# Patient Record
Sex: FEMALE | Race: WHITE | NOT HISPANIC OR LATINO | Employment: FULL TIME | URBAN - METROPOLITAN AREA
[De-identification: names, ages, dates, MRNs, and addresses within clinical notes are randomized per-mention and may not be internally consistent; named-entity substitution may affect disease eponyms.]

---

## 2017-01-05 ENCOUNTER — OFFICE VISIT - RIVER FALLS (OUTPATIENT)
Dept: FAMILY MEDICINE | Facility: CLINIC | Age: 22
End: 2017-01-05

## 2017-01-05 ASSESSMENT — MIFFLIN-ST. JEOR: SCORE: 1478.3

## 2017-05-24 ENCOUNTER — OFFICE VISIT - RIVER FALLS (OUTPATIENT)
Dept: FAMILY MEDICINE | Facility: CLINIC | Age: 22
End: 2017-05-24

## 2017-12-18 ENCOUNTER — OFFICE VISIT - RIVER FALLS (OUTPATIENT)
Dept: FAMILY MEDICINE | Facility: CLINIC | Age: 22
End: 2017-12-18

## 2017-12-18 ASSESSMENT — MIFFLIN-ST. JEOR: SCORE: 1447.45

## 2018-08-16 ENCOUNTER — OFFICE VISIT - RIVER FALLS (OUTPATIENT)
Dept: FAMILY MEDICINE | Facility: CLINIC | Age: 23
End: 2018-08-16

## 2018-12-19 ENCOUNTER — OFFICE VISIT - RIVER FALLS (OUTPATIENT)
Dept: FAMILY MEDICINE | Facility: CLINIC | Age: 23
End: 2018-12-19

## 2019-11-04 ENCOUNTER — COMMUNICATION - RIVER FALLS (OUTPATIENT)
Dept: FAMILY MEDICINE | Facility: CLINIC | Age: 24
End: 2019-11-04

## 2020-07-09 ENCOUNTER — OFFICE VISIT - RIVER FALLS (OUTPATIENT)
Dept: FAMILY MEDICINE | Facility: CLINIC | Age: 25
End: 2020-07-09

## 2020-07-09 ASSESSMENT — MIFFLIN-ST. JEOR: SCORE: 1483.74

## 2021-12-24 ENCOUNTER — OFFICE VISIT - RIVER FALLS (OUTPATIENT)
Dept: FAMILY MEDICINE | Facility: CLINIC | Age: 26
End: 2021-12-24

## 2022-02-12 VITALS
SYSTOLIC BLOOD PRESSURE: 127 MMHG | BODY MASS INDEX: 26.19 KG/M2 | TEMPERATURE: 98.4 F | DIASTOLIC BLOOD PRESSURE: 76 MMHG | HEART RATE: 62 BPM | WEIGHT: 158.6 LBS

## 2022-02-12 VITALS
OXYGEN SATURATION: 99 % | DIASTOLIC BLOOD PRESSURE: 70 MMHG | BODY MASS INDEX: 25.99 KG/M2 | HEART RATE: 93 BPM | WEIGHT: 156 LBS | SYSTOLIC BLOOD PRESSURE: 116 MMHG | HEIGHT: 65 IN

## 2022-02-12 VITALS
HEART RATE: 60 BPM | WEIGHT: 164 LBS | TEMPERATURE: 98.3 F | DIASTOLIC BLOOD PRESSURE: 80 MMHG | RESPIRATION RATE: 16 BRPM | SYSTOLIC BLOOD PRESSURE: 118 MMHG | HEIGHT: 65 IN | BODY MASS INDEX: 27.32 KG/M2

## 2022-02-12 VITALS
DIASTOLIC BLOOD PRESSURE: 72 MMHG | WEIGHT: 162.8 LBS | BODY MASS INDEX: 27.12 KG/M2 | HEIGHT: 65 IN | TEMPERATURE: 98.1 F | SYSTOLIC BLOOD PRESSURE: 118 MMHG | HEART RATE: 63 BPM

## 2022-02-12 VITALS
BODY MASS INDEX: 25.33 KG/M2 | TEMPERATURE: 98.3 F | SYSTOLIC BLOOD PRESSURE: 120 MMHG | HEART RATE: 58 BPM | WEIGHT: 153.4 LBS | DIASTOLIC BLOOD PRESSURE: 72 MMHG

## 2022-02-12 VITALS
DIASTOLIC BLOOD PRESSURE: 72 MMHG | HEART RATE: 64 BPM | WEIGHT: 160.8 LBS | BODY MASS INDEX: 26.55 KG/M2 | SYSTOLIC BLOOD PRESSURE: 126 MMHG | TEMPERATURE: 98.1 F

## 2022-02-12 VITALS
DIASTOLIC BLOOD PRESSURE: 73 MMHG | BODY MASS INDEX: 26.64 KG/M2 | TEMPERATURE: 98.6 F | SYSTOLIC BLOOD PRESSURE: 116 MMHG | HEART RATE: 70 BPM | WEIGHT: 155.2 LBS

## 2022-02-15 NOTE — PROGRESS NOTES
Patient:   CHANEL ALBERTO            MRN: [id number]            FIN: [id number]               Age:   22 years     Sex:  Female     :  1995   Associated Diagnoses:   Right wrist pain   Author:   Brian Haynes MD      Chief Complaint   2017 2:34 PM CST   pt here for pain in right hand, she ice skates and she hit her hand on the board, has pain right near her right thumb, injury occured 2 weeks ago        Subjective   Additional information see chief complaint as noted above and confirmed with the patient.        Health Status   Allergies:    Allergic Reactions (Selected)  No known allergies   Medications:  (Selected)   Prescriptions  Prescribed  Combivent Respimat CFC free 20 mcg-100 mcg/inh inhalation aerosol: 1 puff(s), inh, qid, # 3 EA, 3 Refill(s), Type: Maintenance, Pharmacy: [facility], 1 puff(s) inh qid,x30 day(s)  Flovent  mcg/inh inhalation aerosol: 1 puff(s), inh, bid, # 3 EA, 3 Refill(s), Type: Maintenance, Pharmacy: [facility], 1 puff(s) inh bid  Ocella 3 mg-0.03 mg oral tablet: 1 tab(s), po, daily, # 84 tab(s), 4 Refill(s), Type: Maintenance, Pharmacy: [facility], 1 tab(s) po daily,x84 day(s)  fluticasone 50 mcg/inh nasal spray: 1 spray(s), nasal, bid, Instructions: in each nostril, # 3 EA, 3 Refill(s), Type: Maintenance, Pharmacy: [facility], 1 spray(s) nasal bid,Instr:in each nostril  ketoconazole 2% topical shampoo: 1 david, TOP, 2x/Wk, # 120 mL, 11 Refill(s), Type: Maintenance, Pharmacy: [facility], 1 david top 2x/wk,x30 day(s)   Problem list:    All Problems  Obesity / 7893076141 / Probable  Acne vulgaris / 043256136 / Confirmed      Objective   Vital Signs   2017 2:34 PM CST Peripheral Pulse Rate 93 bpm    Pulse Site Radial artery    Systolic Blood Pressure 116 mmHg    Diastolic Blood Pressure 70 mmHg    Mean Arterial Pressure 85 mmHg    BP Site Right arm     Oxygen Saturation 99 %      Measurements from flowsheet : Measurements   12/18/2017 2:34 PM CST Height Measured 65.25 in    Weight Measured 156 lb    BSA 1.8 m2    Body Mass Index 25.76 kg/m2      General:  Alert and oriented, No acute distress.    Musculoskeletal:  mild tenderness over lateral dorsal wrist  no bruising,  good ROM.    Psychiatric:  Cooperative, Appropriate mood & affect.       Impression and Plan   Assessment and Plan:          Diagnosis: Right wrist pain (IFY53-ZN M25.531).         Course:        wrist xray is normal radiology to over read,  discussed with patient  reviewed appropriate activity  discussed stretching and strengthening  gave information about icing and heat  reviewed medication such as NSAIDs.

## 2022-02-15 NOTE — PROGRESS NOTES
Patient:   CHANEL ALBERTO            MRN: [id number]            FIN: [id number]               Age:   21 years     Sex:  Female     :  1995   Associated Diagnoses:   Encounter for removal of sutures   Author:   Eben Wall MD      Visit Information      Date of Service: 2017 10:24 am  Performing Location: [facility]  Encounter#: [id number]      Primary Care Provider (PCP):  Zaki Vieira MD# [id number]      Referring Provider:  No referring provider recorded for selected visit.      Chief Complaint      History of Present Illness   Healing well.      Review of Systems   Constitutional:  No fever.    Integumentary:  no drainage from the wound.       Health Status   Allergies:    Allergic Reactions (Selected)  No known allergies   Medications:  (Selected)   Prescriptions  Prescribed  Combivent Respimat CFC free 20 mcg-100 mcg/inh inhalation aerosol: 1 puff(s), inh, qid, # 1 EA, 11 Refill(s), Type: Maintenance, Pharmacy: [facility], 1 puff(s) inh qid,x30 day(s)  Flovent  mcg/inh inhalation aerosol: 1 puff(s), inh, bid, # 3 EA, 5 Refill(s), Type: Maintenance, Pharmacy: [facility], 1 puff(s) inh bid  Ocella 3 mg-0.03 mg oral tablet: 1 tab(s), po, daily, # 84 tab(s), 4 Refill(s), Type: Maintenance, Pharmacy: [facility], 1 tab(s) po daily,x84 day(s)  fluticasone 50 mcg/inh nasal spray: 1 spray(s), nasal, bid, Instructions: in each nostril, # 1 EA, 3 Refill(s), Type: Maintenance, Pharmacy: [facility], 1 spray(s) nasal bid,Instr:in each nostril  ketoconazole 2% topical shampoo: 1 david, TOP, 2x/Wk, # 120 mL, 1 Refill(s), Type: Maintenance, Pharmacy: [id number], 1 david top 2x/wk,x30 day(s)   Problem list:    All Problems  Acne vulgaris / SNOMED CT 562188533 / Confirmed  Obesity / SNOMED CT 5057342648 / Probable      Histories   Procedure history:    Repair of ingrown toenail  (SNOMED CT 4440651514) on 1/11/2010 at 14 Years.  Comments:  1/28/2011 12:57 PM - Migdalia Rosa  Right great toe, lateral border.  Tonsillectomy and adenoidectomy (SNOMED CT 998613406) in 2000 at 5 Years.  Varicella vaccination (SNOMED CT 375912656) in 1997 at 2 Years.  Myringotomy and insertion of T tube (SNOMED CT 756763300) in 1997 at 2 Years.      Physical Examination   Integumentary:  laceration well healed; no drainage or redness.       Review / Management   Course:  sutures removed with some dehiscence.    After removing stitiches became dizzy and passed out. Mom states this is common but did have a confused emergence reaction      Impression and Plan   Diagnosis     Encounter for removal of sutures (WQT84-TP Z48.02).     Some dehiscence but should heal fine and keep covered while out and about.    Vasovagal episode: no driving today. It is common for her with blood or pain

## 2022-02-15 NOTE — PROGRESS NOTES
Patient:   CHANEL ALBERTO            MRN: [id number]            FIN: [id number]               Age:   25 years     Sex:  Female     :  1995   Associated Diagnoses:   Eustachian tube dysfunction   Author:   Harpreet Galvan PA-C      Chief Complaint   2020 3:13 PM CDT     Pt here for left ear pain x 5 days.      History of Present Illness   plugged left ear for 5 days,  no pain         Review of Systems   Constitutional:  No fever.    Ear/Nose/Mouth/Throat:  Nasal congestion, Sore throat.         Decreased hearing: On the left.    Respiratory:  Negative.       Health Status   Allergies:    Allergic Reactions (Selected)  No Known Medication Allergies   Medications:  (Selected)   Prescriptions  Prescribed  Combivent Respimat 20 mcg-100 mcg/inh inhalation aerosol: 1 puff(s), inh, qid, # 3 EA, 3 Refill(s), Type: Maintenance, Pharmacy: [facility], 1 puff(s) Inhale qid,x30 day(s)  Flovent  mcg/inh inhalation aerosol: = 1 puff(s), inh, bid, # 1 EA, 0 Refill(s), Type: Maintenance, Pharmacy: [id number], 1 puff(s) Inhale bid  Ocella 3 mg-0.03 mg oral tablet: 1 tab(s), po, daily, # 28 tab(s), 0 Refill(s), Type: Maintenance, Pharmacy: [facility][id number], 1 tab(s) Oral daily  fluticasone 50 mcg/inh nasal spray: 1 spray(s), nasal, bid, Instructions: in each nostril, # 3 EA, 3 Refill(s), Type: Maintenance, Pharmacy: [facility], 1 spray(s) Nasal bid,Instr:in each nostril   Problem list:    All Problems  Acne vulgaris / SNOMED CT 252759083 / Confirmed  Obesity / SNOMED CT 9410576765 / Probable  Asthma, exercise induced / SNOMED CT 53957188 / Confirmed      Histories   Past Medical History:    No active or resolved past medical history items have been selected or recorded.   Family History:       Procedure history:    Repair of ingrown toenail (5210336144) on 2010 at 14 Years.  Comments:  2011 12:57 PM Migdalia Mcnamara  great toe, lateral border.  Tonsillectomy and adenoidectomy (128609577) in 2000 at 5 Years.  Varicella vaccination (865027639) in 1997 at 2 Years.  Myringotomy and insertion of T tube (069276319) in 1997 at 2 Years.      Physical Examination   Vital Signs   7/9/2020 3:13 PM CDT Temperature Tympanic 98.3 DegF    Peripheral Pulse Rate 60 bpm    Pulse Site Radial artery    Respiratory Rate 16 br/min    Systolic Blood Pressure 118 mmHg    Diastolic Blood Pressure 80 mmHg    Mean Arterial Pressure 93 mmHg    BP Site Right arm      Measurements from flowsheet : Measurements   7/9/2020 3:13 PM CDT Height Measured - Standard 65.25 in    Weight Measured - Standard 164 lb    BSA 1.85 m2    Body Mass Index 27.08 kg/m2  HI      General:  No acute distress.    HENT:  Tympanic membranes are clear, No pharyngeal erythema, No sinus tenderness, cannot do Valsalva manuever to get ears to pop, no cerumen in ear canals.    Neck:  Supple, Non-tender, No lymphadenopathy.    Respiratory:  Lungs are clear to auscultation.       Impression and Plan   Diagnosis     Eustachian tube dysfunction (QAY93-ZG H69.80).     Summary:  will treat with atrovent nasal spray, advised to use Mucinex D, follow up if not improving.    Orders     Orders   Pharmacy:  Atrovent 42 mcg/inh nasal spray (Prescribe): 2 spray(s), nasal, qid, PRN: for nasal congestion, # 1 EA, 2 Refill(s), Type: Maintenance, [id number], 2 spray(s) Nasal qid,PRN:for nasal congestion, 65.25, in, 7/9/2020 3:13 PM CDT, Height Measured, 164, lb, 7/9/2020 3:13 PM CDT, Weight Measured.     Orders   Charges (Evaluation and Management):  39410 office outpatient visit 15 minutes (Charge) (Order): Quantity: 1, Eustachian tube dysfunction.

## 2022-02-15 NOTE — PROGRESS NOTES
Patient:   CHANEL ALBERTO            MRN: [id number]            FIN: [id number]               Age:   23 years     Sex:  Female     :  1995   Associated Diagnoses:   Onychomycosis; Pre-ulcerative corn or callous; Asthma, exercise induced   Author:   Zaki Vieira MD      Visit Information      Date of Service: 2018 11:47 am  Performing Location: [facility]  Encounter#: [id number]      Primary Care Provider (PCP):  Zaki Vieira MD    NPI# [id number]      Referring Provider:  Zaki Vieira MD# [id number]      Chief Complaint   2018 11:52 AM CDT   Bilateral foot concerns, both feet.        History of Present Illness   chief complaint and symptoms as noted above confirmed with patient   A few skin changes and nail changes for months getting better  Doing well with exercise asthma flovent daily combivent before practice  Doing well with ocp      Review of Systems   Constitutional:  Negative except as documented in history of present illness.    Ear/Nose/Mouth/Throat:  Negative.    Respiratory:  Negative except as documented in history of present illness.    Cardiovascular:  Negative.    Gastrointestinal:  Negative.    Genitourinary:  Negative.    Musculoskeletal:  Negative.    Integumentary:  Negative except as documented in history of present illness.    Neurologic:  Negative.       Health Status   Allergies:    Allergic Reactions (Selected)  No known allergies   Medications:  (Selected)   Prescriptions  Prescribed  Combivent Respimat CFC free 20 mcg-100 mcg/inh inhalation aerosol: 1 puff(s), inh, qid, # 3 EA, 3 Refill(s), Type: Maintenance, [facility], 1 puff(s) inh qid,x30 day(s)  Flovent  mcg/inh inhalation aerosol: 1 puff(s), inh, bid, # 3 EA, 3 Refill(s), Type: Maintenance, Pharm[facility], 1 puff(s) inh bid  Ocella 3 mg-0.03 mg oral tablet: 1 tab(s), po, daily, # 84 tab(s), 4 Refill(s),  Type: Maintenance, Pharmacy: [facility], 1 tab(s) po daily,x84 day(s)  fluticasone 50 mcg/inh nasal spray: 1 spray(s), nasal, bid, Instructions: in each nostril, # 3 EA, 3 Refill(s), Type: Maintenance, Pharmacy: [facility], 1 spray(s) nasal bid,Instr:in each nostril  ketoconazole 2% topical shampoo: 1 david, TOP, 2x/Wk, # 120 mL, 11 Refill(s), Type: Maintenance, Pharmacy: [facility], 1 david top 2x/wk,x30 day(s),    Medications          *denotes recorded medication          Combivent Respimat CFC free 20 mcg-100 mcg/inh inhalation aerosol: 1 puff(s), inh, qid, for 30 day(s), 3 EA, 3 Refill(s).          Ocella 3 mg-0.03 mg oral tablet: 1 tab(s), po, daily, for 84 day(s), 84 tab(s), 4 Refill(s).          Flovent  mcg/inh inhalation aerosol: 1 puff(s), inh, bid, 3 EA, 3 Refill(s).          fluticasone 50 mcg/inh nasal spray: 1 spray(s), nasal, bid, in each nostril, 3 EA, 3 Refill(s).          ketoconazole 2% topical shampoo: 1 david, TOP, 2x/Wk, for 30 day(s), 120 mL, 11 Refill(s).     Problem list:    All Problems  Acne vulgaris / SNOMED CT 016570705 / Confirmed  Obesity / SNOMED CT 6793553844 / Probable      Histories   Past Medical History:    No active or resolved past medical history items have been selected or recorded.   Family History:       Procedure history:    Repair of ingrown toenail (SNOMED CT 7518368218) on 1/11/2010 at 14 Years.  Comments:  1/28/2011 12:57 PM - Migdalia Rosa  Right great toe, lateral border.  Tonsillectomy and adenoidectomy (SNOMED CT 950906118) in 2000 at 5 Years.  Varicella vaccination (SNOMED CT 005116210) in 1997 at 2 Years.  Myringotomy and insertion of T tube (SNOMED CT 559499594) in 1997 at 2 Years.   Social History:        Alcohol Assessment            Never      Tobacco Assessment            Never      Substance Abuse Assessment            Never      Employment and Education Assessment            Student       Exercise and Physical Activity Assessment            Exercise frequency: 3-4 times/week.                     Comments:                      04/11/2016 - Arina Askew MA                     Figure skating, Hiking      Sexual Assessment            Sexually active: No.        Physical Examination   Vital Signs   8/16/2018 11:52 AM CDT Temperature Tympanic 98.6 DegF    Peripheral Pulse Rate 70 bpm    HR Method Electronic    Systolic Blood Pressure 116 mmHg    Diastolic Blood Pressure 73 mmHg    Mean Arterial Pressure 87 mmHg    BP Method Electronic      Measurements from flowsheet : Measurements   8/16/2018 11:52 AM CDT   Weight Measured - Standard                155.2 lb     General:  Alert and oriented, No acute distress.    Integumentary:  A few toe nails show some darking and thickening at tips  skin shows a few callouses.    Neurologic:  Alert, Oriented.       Impression and Plan   Diagnosis     Onychomycosis (XZI79-ZN B35.1).     Pre-ulcerative corn or callous (MWD58-LS L84).     Asthma, exercise induced (QDT39-ML J45.990).     Course:  Progressing as expected.    Plan:  foot care consider lamisil.    Patient Instructions:       Counseled: Patient, Family, Regarding diagnosis, Regarding treatment, Regarding medications, Activity.

## 2022-02-15 NOTE — TELEPHONE ENCOUNTER
---------------------  From: Kate Hernández LPN (Phone Messages Pool ([id number]_[facility]))   To: CHANEL ALBERTO    Sent: 11/6/2019 8:38:56 AM CST  Subject: FW: Birth Control & Inhaler Refill - Dr. Dariel Partida,    Both prescriptions were sent to [facility] on [facility].    Maria Guadalupe,  Kate AVILEZ LPN        ---------------------  From: CHANEL ALBERTO  To[facility]  Sent: 11/06/2019 08:24 a.m. CST  Subject: RE: Birth Control & Inhaler Refill - Dr. Vieira  Thanks, I will try to get an appointment soon.  If you could send it to the [facility] pharmacy at [address] [place] [address] that would be great. Thanks again.    Helga  ---------------------  From: Selena Silva CMA (Phone Messages Pool ([id number]_[facility]))  To: CHANEL ALBERTO  Sent: 11/4/2019 9:44:53 AM CST  Subject: RE: Birth Control & Inhaler Refill - Dr. Dariel Gaspar,       Being that you are three months overdue for an annual exam, we can only send in a 30 day protocol supply of your medications. Is there a local pharmacy you would like use? Please schedule an appointment for further refills.       Thank you,  Selena WASSERMAN CMA            ---------------------  From: CHANEL ALBERTO  To: [facility]  Sent: 11/04/2019 09:28 a.m. CST  Subject: Birth Control & Inhaler Refill - Dr. Vieira  Could I get refills on both my birth control and Flovent inhaler sent to the [facility] online pharmacy?  Thanks,  Helga

## 2022-02-15 NOTE — PROGRESS NOTES
Patient:   CHANEL ALBERTO            MRN: [id number]            FIN: [id number]               Age:   26 years     Sex:  Female     :  1995   Associated Diagnoses:   Cellulitis of toe of right foot   Author:   Leanna RICH, Valentin      Report Summary   Diagnosis  Cellulitis of toe of right foot (XMI79-IO L03.031).  Patient InstructionsSummaryOrders   Visit Information   Visit type:  General concerns.    Accompanied by:  No one.    Source of history:  Self, Medical record.    Referral source:  Self.    History limitation:  None.       Chief Complaint   2021 10:34 AM CST  Right third toe had sutures removed, now has yellow discharge and redness        History of Present Illness   Exacerbating factors consist of movement.  Relieving factors consist of none.  Right third toe is erythematous. Had some purulent drainage. No fever or chills. Has callus and bone spur excised. Sutures out . CC above noted and confirmed with the patient..        Review of Systems   Constitutional:  Negative.    Musculoskeletal:  Negative except as documented in history of present illness.    Integumentary:  Negative except as documented in history of present illness.    Neurologic:  Negative.       Health Status   Allergies:    Allergic Reactions (Selected)  No Known Medication Allergies   Medications:  (Selected)   Prescriptions  Prescribed  Atrovent 42 mcg/inh nasal spray: 2 spray(s), nasal, qid, PRN: for nasal congestion, # 1 EA, 2 Refill(s), Type: Maintenance, [id number], 2 spray(s) Nasal qid,PRN:for nasal congestion, 65.25, in, 20 15:13:00 CDT, Height Measured, 164, lb, 20 15:13:00 CDT,...  Combivent Respimat 20 mcg-100 mcg/inh inhalation aerosol: 1 puff(s), inh, qid, # 3 EA, 3 Refill(s), Type: Maintenance, Pharmacy: [facility], 1 puff(s) Inhale qid,x30 day(s)  Flovent  mcg/inh inhalation aerosol: = 1 puff(s), inh, bid, # 1 EA, 0 Refill(s), Type:  Maintenance, Pharmacy: [facility] #[id number], 1 puff(s) Inhale bid  Keflex 500 mg oral capsule: = 1 cap(s) ( 500 mg ), PO, TID, x 7 day(s), # 21 cap(s), 0 Refill(s), Type: Acute, Pharmacy: [facility] #[id number], 1 cap(s) Oral tid,x7 day(s), 65.25, in, 07/09/20 15:13:00 CDT, Height Measured, 158.6, lb, 12/24/21 10:34:00 CST, Weight Measured...  Ocella 3 mg-0.03 mg oral tablet: 1 tab(s), po, daily, # 28 tab(s), 0 Refill(s), Type: Maintenance, Pharmacy: [facility] #[id number], 1 tab(s) Oral daily  fluticasone 50 mcg/inh nasal spray: 1 spray(s), nasal, bid, Instructions: in each nostril, # 3 EA, 3 Refill(s), Type: Maintenance, Pharmacy: [facility], 1 spray(s) Nasal bid,Instr:in each nostril   Problem list:    All Problems  Obesity / SNOMED CT 5579800462 / Probable  Asthma, exercise induced / SNOMED CT 45123086 / Confirmed  Acne vulgaris / SNOMED CT 177465039 / Confirmed      Histories   Past Medical History:    No active or resolved past medical history items have been selected or recorded.   Family History:       Procedure history:    Repair of ingrown toenail (2793461091) on 1/11/2010 at 14 Years.  Comments:  1/28/2011 12:57 PM [facility] Migdalia Jones  Right great toe, lateral border.  Tonsillectomy and adenoidectomy (421760685) in 2000 at 5 Years.  Varicella vaccination (714387508) in 1997 at 2 Years.  Myringotomy and insertion of T tube (070704496) in 1997 at 2 Years.   Social History:        Electronic Cigarette/Vaping Assessment            Electronic Cigarette Use: Never.      Alcohol Assessment            Never      Tobacco Assessment            Never            Never (less than 100 in lifetime)      Substance Abuse Assessment            Never      Employment and Education Assessment            Student      Exercise and Physical Activity Assessment            Exercise frequency: 3-4 times/week.                     Comments:                      04/11/2016 - Azar KAPLAN,  Arina IBANEZ                     Figure skating, Hiking      Sexual Assessment            Sexually active: No.        Physical Examination   Vital Signs   12/24/2021 10:34 AM CST Temperature Tympanic 98.4 DegF    Peripheral Pulse Rate 62 bpm    HR Method Electronic    Systolic Blood Pressure 127 mmHg    Diastolic Blood Pressure 76 mmHg    Mean Arterial Pressure 93 mmHg    BP Site Right arm    BP Method Electronic      Measurements from flowsheet : Measurements   12/24/2021 10:34 AM CST  Weight Measured - Standard                158.6 lb     Cardiovascular:       Capillary refill: Right, Lower extremity, Within normal limits.    Integumentary:  Mild erythema to toe is question. No drainage. Mild discomfort. .       Impression and Plan   Diagnosis     Cellulitis of toe of right foot (YSL18-DQ L03.031).     Patient Instructions:       Counseled: Patient, Regarding diagnosis, Regarding medications, Activity, Verbalized understanding.    Summary:  To ortho Quick Sunday if not improved. To ED if acutely worse, with fever, chills etc.    Orders     Orders (Selected)   Prescriptions  Prescribed  Keflex 500 mg oral capsule: = 1 cap(s) ( 500 mg ), PO, TID, x 7 day(s), # 21 cap(s), 0 Refill(s), Type: Acute, Pharmacy: [facility] #[id number], 1 cap(s) Oral tid,x7 day(s), 65.25, in, 07/09/20 15:13:00 CDT, Height Measured, 158.6, lb, 12/24/21 10:34:00 CST, Weight Measured....

## 2022-02-15 NOTE — LETTER
(Inserted Image. Unable to display)     August 19, 2019      CHANEL ALBERTO  [address] [place], [place] [id number]          Dear CHANEL,      Thank you for selecting [facility] for your healthcare needs.      Our records indicate you are due for the following services:     Annual Physical and Gynecologic Exam ~ Yearly wellness exams are important for your ongoing health and wellness.  This exam gives you the opportunity to meet with your health care provider to review your health, update immunizations and to recommend preventive screenings that you may be due for.  At your wellness visit, your Healthcare Provider will determine the need for a gynecologic exam and/or Pap smear.      To schedule an appointment or if you have further questions, please contact your primary clinic:   [facility]       [phone]   [facility]       [phone]              [facility]     [phone]      Powered by [employer] [facility]    Sincerely,    Zaki Vieira MD

## 2022-02-15 NOTE — NURSING NOTE
Comprehensive Intake Entered On:  7/9/2020 3:18 PM CDT    Performed On:  7/9/2020 3:13 PM CDT by Tayo Eldridge CMA               Summary   Chief Complaint :   Pt here for left ear pain x 5 days.   Menstrual Status :   Menarcheal   Weight Measured :   164 lb(Converted to: 164 lb 0 oz, 74.39 kg)    Height Measured :   65.25 in(Converted to: 5 ft 5 in, 165.73 cm)    Body Mass Index :   27.08 kg/m2 (HI)    Body Surface Area :   1.85 m2   Systolic Blood Pressure :   118 mmHg   Diastolic Blood Pressure :   80 mmHg   Mean Arterial Pressure :   93 mmHg   Peripheral Pulse Rate :   60 bpm   BP Site :   Right arm   Pulse Site :   Radial artery   Temperature Tympanic :   98.3 DegF(Converted to: 36.8 DegC)    Respiratory Rate :   16 br/min   Tayo Eldridge CMA - 7/9/2020 3:13 PM CDT   Health Status   Allergies Verified? :   Yes   Medication History Verified? :   Yes   Immunizations Current :   No   Medical History Verified? :   Yes   Pre-Visit Planning Status :   Not completed   Tobacco Use? :   Never smoker   Tayo Eldridge CMA - 7/9/2020 3:13 PM CDT   Meds / Allergies   (As Of: 7/9/2020 3:18:15 PM CDT)   Allergies (Active)   No Known Medication Allergies  Estimated Onset Date:   Unspecified ; Created By:   Gianna Miller; Reaction Status:   Active ; Category:   Drug ; Substance:   No Known Medication Allergies ; Type:   Allergy ; Updated By:   Gianna Miller; Reviewed Date:   7/9/2020 3:16 PM CDT        Medication List   (As Of: 7/9/2020 3:18:15 PM CDT)   Prescription/Discharge Order    drospirenone-ethinyl estradiol  :   drospirenone-ethinyl estradiol ; Status:   Prescribed ; Ordered As Mnemonic:   Ocella 3 mg-0.03 mg oral tablet ; Simple Display Line:   1 tab(s), po, daily, 28 tab(s), 0 Refill(s) ; Ordering Provider:   Zaki Vieira MD; Catalog Code:   drospirenone-ethinyl estradiol ; Order Dt/Tm:   11/6/2019 8:37:42 AM CST          fluticasone  :   fluticasone ; Status:   Prescribed ; Ordered As Mnemonic:    Flovent  mcg/inh inhalation aerosol ; Simple Display Line:   1 puff(s), inh, bid, 1 EA, 0 Refill(s) ; Ordering Provider:   Zaki Vieira MD; Catalog Code:   fluticasone ; Order Dt/Tm:   11/6/2019 8:36:19 AM CST          albuterol-ipratropium  :   albuterol-ipratropium ; Status:   Prescribed ; Ordered As Mnemonic:   Combivent Respimat 20 mcg-100 mcg/inh inhalation aerosol ; Simple Display Line:   1 puff(s), inh, qid, for 30 day(s), 3 EA, 3 Refill(s) ; Ordering Provider:   Zaki Vieira MD; Catalog Code:   albuterol-ipratropium ; Order Dt/Tm:   8/16/2018 12:11:23 PM CDT          fluticasone nasal  :   fluticasone nasal ; Status:   Prescribed ; Ordered As Mnemonic:   fluticasone 50 mcg/inh nasal spray ; Simple Display Line:   1 spray(s), nasal, bid, in each nostril, 3 EA, 3 Refill(s) ; Ordering Provider:   Zaki Vieira MD; Catalog Code:   fluticasone nasal ; Order Dt/Tm:   8/16/2018 12:11:17 PM CDT            ID Risk Screen   Recent Travel History :   No recent travel   Family Member Travel History :   No recent travel   Other Exposure to Infectious Disease :   Unknown   Tayo Eldridge CMA - 7/9/2020 3:13 PM CDT   Social History   Social History   (As Of: 7/9/2020 3:18:15 PM CDT)   Alcohol:        Never   (Last Updated: 1/24/2013 11:16:04 AM CST by Lilly Chopra RN)          Tobacco:        Never   (Last Updated: 1/24/2013 11:15:47 AM CST by Lilly Chopra RN)          Substance Abuse:        Never   (Last Updated: 1/24/2013 11:15:43 AM CST by Lilly Chopra RN)          Employment/School:        Student   (Last Updated: 4/11/2016 9:55:01 AM CDT by Arina Askew MA)          Exercise:        Exercise frequency: 3-4 times/week.   Comments:  4/11/2016 9:54 AM - Arina Askew MA: Figure skating, Hiking   (Last Updated: 4/11/2016 9:54:41 AM CDT by Arina Askew MA)          Sexual:        Sexually active: No.   (Last Updated: 1/24/2013 11:16:25 AM CST by Nav VELZI,  Lilly)            OB/GYN History   Menstrual Status :   Menarcheal   Tayo Eldridge CMA - 7/9/2020 3:13 PM CDT   Pregnancy History   (As Of: 7/9/2020 3:18:15 PM CDT)   No pregnancy history documented

## 2022-02-15 NOTE — TELEPHONE ENCOUNTER
---------------------  From: Leslie Shanks CMA   Sent: 1/3/2019 2:47:45 PM CST  Subject: General Message-Echo     Phone Message    PCP:   LEI      Time of Call:  1428       Person Calling:  self   Phone number:  [phone]    Returned call at: 1445    Note:   calling for Echo results.    Pt called and notified that a letter went out today and Echo was normal

## 2022-02-15 NOTE — NURSING NOTE
Comprehensive Intake Entered On:  12/24/2021 10:38 AM CST    Performed On:  12/24/2021 10:34 AM CST by Radha León CMA               Summary   Chief Complaint :   Right third toe had sutures removed, now has yellow discharge and redness    Menstrual Status :   Menarcheal   Weight Measured :   158.6 lb(Converted to: 158 lb 10 oz, 71.940 kg)    Systolic Blood Pressure :   127 mmHg   Diastolic Blood Pressure :   76 mmHg   Mean Arterial Pressure :   93 mmHg   Peripheral Pulse Rate :   62 bpm   BP Site :   Right arm   BP Method :   Electronic   HR Method :   Electronic   Temperature Tympanic :   98.4 DegF(Converted to: 36.9 DegC)    Radha León CMA - 12/24/2021 10:34 AM CST   Health Status   Allergies Verified? :   Yes   Medication History Verified? :   Yes   Immunizations Current :   No   Medical History Verified? :   Yes   Pre-Visit Planning Status :   Completed   Tobacco Use? :   Never smoker   Radha León CMA - 12/24/2021 10:34 AM CST   Consents   Consent for Immunization Exchange :   Consent Granted   Consent for Immunizations to Providers :   Consent Granted   Radha León CMA - 12/24/2021 10:34 AM CST   Meds / Allergies   (As Of: 12/24/2021 10:38:51 AM CST)   Allergies (Active)   No Known Medication Allergies  Estimated Onset Date:   Unspecified ; Created By:   Gianna Miller; Reaction Status:   Active ; Category:   Drug ; Substance:   No Known Medication Allergies ; Type:   Allergy ; Updated By:   Gianna Miller; Reviewed Date:   12/24/2021 10:37 AM CST        Medication List   (As Of: 12/24/2021 10:38:51 AM CST)   Prescription/Discharge Order    ipratropium nasal  :   ipratropium nasal ; Status:   Prescribed ; Ordered As Mnemonic:   Atrovent 42 mcg/inh nasal spray ; Simple Display Line:   2 spray(s), nasal, qid, PRN: for nasal congestion, 1 EA, 2 Refill(s) ; Ordering Provider:   Cole RICH, Harpreet HOPPER; Catalog Code:   ipratropium nasal ; Order Dt/Tm:   7/9/2020 3:28:17 PM CDT           drospirenone-ethinyl estradiol  :   drospirenone-ethinyl estradiol ; Status:   Prescribed ; Ordered As Mnemonic:   Ocella 3 mg-0.03 mg oral tablet ; Simple Display Line:   1 tab(s), po, daily, 28 tab(s), 0 Refill(s) ; Ordering Provider:   Zaki Vieira MD; Catalog Code:   drospirenone-ethinyl estradiol ; Order Dt/Tm:   11/6/2019 8:37:42 AM CST          fluticasone nasal  :   fluticasone nasal ; Status:   Prescribed ; Ordered As Mnemonic:   fluticasone 50 mcg/inh nasal spray ; Simple Display Line:   1 spray(s), nasal, bid, in each nostril, 3 EA, 3 Refill(s) ; Ordering Provider:   Zaki Vieira MD; Catalog Code:   fluticasone nasal ; Order Dt/Tm:   8/16/2018 12:11:17 PM CDT          albuterol-ipratropium  :   albuterol-ipratropium ; Status:   Prescribed ; Ordered As Mnemonic:   Combivent Respimat 20 mcg-100 mcg/inh inhalation aerosol ; Simple Display Line:   1 puff(s), inh, qid, for 30 day(s), 3 EA, 3 Refill(s) ; Ordering Provider:   Zaki Vieira MD; Catalog Code:   albuterol-ipratropium ; Order Dt/Tm:   8/16/2018 12:11:23 PM CDT          fluticasone  :   fluticasone ; Status:   Prescribed ; Ordered As Mnemonic:   Flovent  mcg/inh inhalation aerosol ; Simple Display Line:   1 puff(s), inh, bid, 1 EA, 0 Refill(s) ; Ordering Provider:   Zaki Vieira MD; Catalog Code:   fluticasone ; Order Dt/Tm:   11/6/2019 8:36:19 AM CST            Social History   Social History   (As Of: 12/24/2021 10:38:51 AM CST)   Alcohol:        Never   (Last Updated: 1/24/2013 11:16:04 AM CST by Lilly Chopra RN)          Tobacco:        Never   (Last Updated: 1/24/2013 11:15:47 AM CST by Nav RN, Lilly)   Never (less than 100 in lifetime)   (Last Updated: 12/24/2021 10:34:18 AM CST by Radha León CMA)          Electronic Cigarette/Vaping:        Electronic Cigarette Use: Never.   (Last Updated: 12/24/2021 10:37:15 AM CST by Radha León CMA)          Substance Abuse:        Never   (Last Updated:  1/24/2013 11:15:43 AM CST by Lilly Chopra RN)          Employment/School:        Student   (Last Updated: 4/11/2016 9:55:01 AM CDT by Arina Askew MA)          Exercise:        Exercise frequency: 3-4 times/week.   Comments:  4/11/2016 9:54 AM - Arina Askew MA: Figure skating, Hiking   (Last Updated: 4/11/2016 9:54:41 AM CDT by Arina Askew MA)          Sexual:        Sexually active: No.   (Last Updated: 1/24/2013 11:16:25 AM CST by Lilly Chopra RN)

## 2022-02-15 NOTE — LETTER
(Inserted Image. Unable to display)   January 03, 2019      CHANEL ALBERTO  [address] [place], [place] [id number]        Dear CHANEL,      Thank you for selecting [facility] for your healthcare needs.     Your recent echocardiogram was normal.           Please contact me or my assistant at [phone] if you have any questions or concerns.     Sincerely,        Zaki Vieira MD

## 2022-02-15 NOTE — TELEPHONE ENCOUNTER
---------------------  From: Selena Silva CMA (Phone Messages Pool ([id number]_[facility]))   To: CHANEL ALBERTO    Sent: 11/4/2019 9:44:53 AM CST  Subject: RE: Birth Control & Inhaler Refill - Dr. Dariel Gaspar,    Being that you are three months overdue for an annual exam, we can only send in a 30 day protocol supply of your medications. Is there a local pharmacy you would like use? Please schedule an appointment for further refills.    Thank you,  Selena WASSERMAN CMA      ---------------------  From: CHANEL ALBERTO  To: [facility]  Sent: 11/04/2019 09:28 a.m. CST  Subject: Birth Control & Inhaler Refill - Dr. Vieira  Could I get refills on both my birth control and Flovent inhaler sent to the [facility] online pharmacy?    Thanks,  Helga

## 2022-02-15 NOTE — PROGRESS NOTES
Patient:   CHANEL ALBERTO            MRN: [id number]            FIN: [id number]               Age:   23 years     Sex:  Female     :  1995   Associated Diagnoses:   Asthma, exercise induced   Author:   Zaki Vieira MD      Visit Information      Date of Service: 2018 11:30 am  Performing Location: [facility]  Encounter#: [id number]      Primary Care Provider (PCP):  Zaki Vieira MD    NPI# [id number]      Referring Provider:  Zaki Vieira MD, NPI# [id number]      Chief Complaint   2018 11:32 AM CST  Asthma, worsening with sports.      History of Present Illness   Patient is in because of shortness of breath with training.  She thinks consider exercise-induced asthma.  She notes when she is finally working up to competition level she is getting more much more dyspneic.  Is only with peak skating.  No chest pain no tachycardia no significant cough.  She is using her Flovent she pre-medicates before training with Combivent notes no lightheadedness no dizziness no headaches never any chest pain         Review of Systems   Constitutional:  Negative except as documented in history of present illness.    Ear/Nose/Mouth/Throat:  Negative.    Respiratory:  Negative except as documented in history of present illness.    Cardiovascular:  Negative except as documented in history of present illness.    Gastrointestinal:  Negative.    Musculoskeletal:  Negative.    Integumentary:  Negative.    Neurologic:  Negative.       Health Status   Allergies:    Allergic Reactions (Selected)  No Known Medication Allergies   Medications:  (Selected)   Prescriptions  Prescribed  Combivent Respimat 20 mcg-100 mcg/inh inhalation aerosol: 1 puff(s), inh, qid, # 3 EA, 3 Refill(s), Type: Maintenance, Pharmacy: [facility], 1 puff(s) Inhale qid,x30 day(s)  Flovent  mcg/inh inhalation aerosol: 1 puff(s), inh, bid, # 3 EA, 3 Refill(s), Type: Maintenance, Pharmacy:  [facility], 1 puff(s) Inhale bid  Ocella 3 mg-0.03 mg oral tablet: 1 tab(s), po, daily, # 84 tab(s), 4 Refill(s), Type: Maintenance, Pharmacy: [facility], 1 tab(s) Oral daily,x84 day(s)  fluticasone 50 mcg/inh nasal spray: 1 spray(s), nasal, bid, Instructions: in each nostril, # 3 EA, 3 Refill(s), Type: Maintenance, Pharmacy: [facility], 1 spray(s) Nasal bid,Instr:in each nostril,    Medications          *denotes recorded medication          Combivent Respimat 20 mcg-100 mcg/inh inhalation aerosol: 1 puff(s), inh, qid, for 30 day(s), 3 EA, 3 Refill(s).          Ocella 3 mg-0.03 mg oral tablet: 1 tab(s), po, daily, for 84 day(s), 84 tab(s), 4 Refill(s).          Flovent  mcg/inh inhalation aerosol: 1 puff(s), inh, bid, 3 EA, 3 Refill(s).          fluticasone 50 mcg/inh nasal spray: 1 spray(s), nasal, bid, in each nostril, 3 EA, 3 Refill(s).     Problem list:    All Problems  Acne vulgaris / SNOMED CT 476533378 / Confirmed  Asthma, exercise induced / SNOMED CT 34027881 / Confirmed  Obesity / SNOMED CT 2398351914 / Probable      Histories   Past Medical History:    No active or resolved past medical history items have been selected or recorded.   Family History:       Procedure history:    Repair of ingrown toenail (SNOMED CT 3797393022) on 1/11/2010 at 14 Years.  Comments:  1/28/2011 12:57 PM Migdalia Mcnamara  Right great toe, lateral border.  Tonsillectomy and adenoidectomy (SNOMED CT 097441342) in 2000 at 5 Years.  Varicella vaccination (SNOMED CT 278879233) in 1997 at 2 Years.  Myringotomy and insertion of T tube (SNOMED CT 227771183) in 1997 at 2 Years.   Social History:        Alcohol Assessment            Never      Tobacco Assessment            Never      Substance Abuse Assessment            Never      Employment and Education Assessment            Student      Exercise and Physical Activity Assessment            Exercise  frequency: 3-4 times/week.                     Comments:                      04/11/2016 - Arina Askew MA                     Figure skating, Hiking      Sexual Assessment            Sexually active: No.      Physical Examination   Vital Signs   12/19/2018 11:32 AM CST Temperature Tympanic 98.3 DegF    Peripheral Pulse Rate 58 bpm  LOW    HR Method Electronic    Systolic Blood Pressure 120 mmHg    Diastolic Blood Pressure 72 mmHg    Mean Arterial Pressure 88 mmHg    BP Method Electronic      Measurements from flowsheet : Measurements   12/19/2018 11:32 AM CST  Weight Measured - Standard                153.4 lb     General:  Alert and oriented, No acute distress.    Eye:  Normal conjunctiva.    HENT:  Tympanic membranes are clear, No pharyngeal erythema.    Neck:  Supple, Non-tender, No lymphadenopathy.    Respiratory:  Lungs are clear to auscultation, Respirations are non-labored, Breath sounds are equal.    Cardiovascular:  Normal rate, Regular rhythm, No murmur.       Impression and Plan   Diagnosis     Asthma, exercise induced (FRX32-EJ J45.990).     Course:  Not progressing as expected.    Patient Instructions:       Counseled: Patient, Family, Regarding diagnosis, Regarding treatment, Regarding medications, Activity.    Pulmonary function testing look fine.  Certainly may be just a conditioning issue but she has been working at this time for several months.  Will check echocardiogram on her will also get CBC and ferritin.

## 2022-02-15 NOTE — PROGRESS NOTES
Patient:   CHANEL ALBERTO            MRN: [id number]            FIN: [id number]               Age:   22 years     Sex:  Female     :  1995   Associated Diagnoses:   Acne vulgaris; Acute joint pain   Author:   Zaki Vieira MD      Visit Information      Date of Service: 2017 01:30 pm  Performing Location: [facility]  Encounter#: [id number]      Chief Complaint   2017 1:33 PM CDT    Left wrist, right shoulder pain.      History of Present Illness   chief complaint and symptoms as noted above confirmed with patient   1 week now normal  no injury  Seasonal inhaler use  rare use of the shampoo  Periods normal         Review of Systems   Constitutional:  Negative.    Eye:  Negative.    Ear/Nose/Mouth/Throat:  Negative.    Respiratory:  Negative.    Cardiovascular:  Negative.    Gastrointestinal:  Negative.    Genitourinary:  Negative.    Hematology/Lymphatics:  Negative.    Endocrine:  Negative.    Immunologic:  Negative.    Musculoskeletal   Integumentary:  Negative.    Neurologic:  Negative.       Health Status   Allergies:    Allergic Reactions (Selected)  No known allergies   Medications:  (Selected)   Prescriptions  Prescribed  Combivent Respimat CFC free 20 mcg-100 mcg/inh inhalation aerosol: 1 puff(s), inh, qid, # 1 EA, 11 Refill(s), Type: Maintenance, Pharmacy: [facility], 1 puff(s) inh qid,x30 day(s)  Flovent  mcg/inh inhalation aerosol: 1 puff(s), inh, bid, # 3 EA, 5 Refill(s), Type: Maintenance, Pharm[facility], 1 puff(s) inh bid  Ocella 3 mg-0.03 mg oral tablet: 1 tab(s), po, daily, # 84 tab(s), 4 Refill(s), Type: Maintenance, Pharmacy: [facility], 1 tab(s) po daily,x84 day(s)  fluticasone 50 mcg/inh nasal spray: 1 spray(s), nasal, bid, Instructions: in each nostril, # 1 EA, 3 Refill(s), Type: Maintenance, Pharm[facility], 1 spray(s) nasal bid,Instr:in each  nostril  ketoconazole 2% topical shampoo: 1 david, TOP, 2x/Wk, # 120 mL, 1 Refill(s), Type: Maintenance, Pharmacy: [id number], 1 david top 2x/wk,x30 day(s)   Problem list:    All Problems  Obesity / SNOMED CT 5935538200 / Probable  Acne vulgaris / SNOMED CT 529451771 / Confirmed      Histories   Past Medical History:    No active or resolved past medical history items have been selected or recorded.   Family History:       Procedure history:    Repair of ingrown toenail (SNOMED CT 7723863836) on 1/11/2010 at 14 Years.  Comments:  1/28/2011 12:57 PM - Migdalia Rosa  Right great toe, lateral border.  Tonsillectomy and adenoidectomy (SNOMED CT 671831649) in 2000 at 5 Years.  Varicella vaccination (SNOMED CT 470288781) in 1997 at 2 Years.  Myringotomy and insertion of T tube (SNOMED CT 396537060) in 1997 at 2 Years.   Social History:        Alcohol Assessment            Never      Tobacco Assessment            Never      Substance Abuse Assessment            Never      Employment and Education Assessment            Student      Exercise and Physical Activity Assessment            Exercise frequency: 3-4 times/week.                     Comments:                      04/11/2016 - Arina Askew MA                     Figure skating, Hiking      Sexual Assessment            Sexually active: No.        Physical Examination   Vital Signs   5/24/2017 1:33 PM CDT Temperature Tympanic 98.1 DegF    Peripheral Pulse Rate 64 bpm    Systolic Blood Pressure 126 mmHg    Diastolic Blood Pressure 72 mmHg    Mean Arterial Pressure 90 mmHg      Measurements from flowsheet : Measurements   5/24/2017 1:33 PM CDT    Weight Measured - Standard                160.8 lb     General:  Alert and oriented, No acute distress.    Neck:  Supple, Non-tender.    Respiratory:  Respirations are non-labored.    Cardiovascular:  Normal rate, Regular rhythm.    Gastrointestinal:  Soft.    Musculoskeletal:  Normal range of motion, Normal  strength, No tenderness, No swelling.    Integumentary:  No rash.    Neurologic:  Alert, Oriented.       Impression and Plan   Diagnosis     Acne vulgaris (JBA44-BD L70.0).     Acute joint pain (JCJ77-JA M25.50).     Course:  Progressing as expected.    Plan:  fu 1 year  Not sexually active.    Patient Instructions:       Counseled: Patient, Regarding diagnosis, Regarding medications, Activity.

## 2022-04-07 ENCOUNTER — OFFICE VISIT (OUTPATIENT)
Dept: FAMILY MEDICINE | Facility: CLINIC | Age: 27
End: 2022-04-07
Payer: COMMERCIAL

## 2022-04-07 ENCOUNTER — TELEPHONE (OUTPATIENT)
Dept: FAMILY MEDICINE | Facility: CLINIC | Age: 27
End: 2022-04-07

## 2022-04-07 VITALS
BODY MASS INDEX: 25.74 KG/M2 | SYSTOLIC BLOOD PRESSURE: 116 MMHG | WEIGHT: 155.9 LBS | OXYGEN SATURATION: 98 % | HEART RATE: 85 BPM | TEMPERATURE: 98.1 F | DIASTOLIC BLOOD PRESSURE: 64 MMHG

## 2022-04-07 DIAGNOSIS — J06.9 VIRAL URI WITH COUGH: ICD-10-CM

## 2022-04-07 DIAGNOSIS — R05.9 COUGH: Primary | ICD-10-CM

## 2022-04-07 DIAGNOSIS — B97.89 SORE THROAT (VIRAL): ICD-10-CM

## 2022-04-07 DIAGNOSIS — J02.8 SORE THROAT (VIRAL): ICD-10-CM

## 2022-04-07 PROBLEM — J45.990 EXERCISE-INDUCED ASTHMA: Status: ACTIVE | Noted: 2022-04-07

## 2022-04-07 PROBLEM — L70.0 ACNE VULGARIS: Status: ACTIVE | Noted: 2022-04-07

## 2022-04-07 LAB
DEPRECATED S PYO AG THROAT QL EIA: NEGATIVE
GROUP A STREP BY PCR: NOT DETECTED

## 2022-04-07 PROCEDURE — 99214 OFFICE O/P EST MOD 30 MIN: CPT | Performed by: NURSE PRACTITIONER

## 2022-04-07 PROCEDURE — 87651 STREP A DNA AMP PROBE: CPT | Performed by: NURSE PRACTITIONER

## 2022-04-07 RX ORDER — DROSPIRENONE AND ETHINYL ESTRADIOL 0.03MG-3MG
1 KIT ORAL DAILY
COMMUNITY
Start: 2022-02-23 | End: 2023-02-23

## 2022-04-07 RX ORDER — CLOBETASOL PROPIONATE 0.5 MG/ML
SOLUTION TOPICAL PRN
COMMUNITY
End: 2023-03-02

## 2022-04-07 RX ORDER — FLUTICASONE PROPIONATE 50 MCG
1-2 SPRAY, SUSPENSION (ML) NASAL DAILY
COMMUNITY
End: 2022-06-14

## 2022-04-07 RX ORDER — ALBUTEROL SULFATE 90 UG/1
2 AEROSOL, METERED RESPIRATORY (INHALATION) EVERY 4 HOURS PRN
COMMUNITY
End: 2022-06-14

## 2022-04-07 RX ORDER — FLUTICASONE PROPIONATE 110 UG/1
1-2 AEROSOL, METERED RESPIRATORY (INHALATION) DAILY
COMMUNITY
End: 2022-06-14

## 2022-04-07 RX ORDER — KETOCONAZOLE 20 MG/ML
SHAMPOO TOPICAL DAILY PRN
COMMUNITY
End: 2024-02-22

## 2022-04-07 NOTE — TELEPHONE ENCOUNTER
0874 Pt was notified by phone that rapid strep was negative. This will be sent in for a culture and if anything shows up patient will be informed. Advised that if symptoms are worsening or not improving to follow up in clinic.

## 2022-04-07 NOTE — LETTER
April 8, 2022      Chasity Irwinsteven  [address] [facility] [address]        Dear ,    We are writing to inform you of your test results.    The rapid strep and the PCR test are both negative, hope you are feeling better.    Resulted Orders   Streptococcus A Rapid Screen w/Reflex to PCR - Clinic Collect   Result Value Ref Range    Group A Strep antigen Negative Negative   Group A Streptococcus PCR Throat Swab   Result Value Ref Range    Group A strep by PCR Not Detected Not Detected    Narrative    The Xpert Xpress Strep A test, performed on the [employer], is a rapid, qualitative in vitro diagnostic test for the detection of Streptococcus pyogenes (Group A ß-hemolytic Streptococcus, Strep A) in throat swab specimens from patients with signs and symptoms of pharyngitis. The Xpert Xpress Strep A test can be used as an aid in the diagnosis of Group A Streptococcal pharyngitis. The assay is not intended to monitor treatment for Group A Streptococcus infections. The Xpert Xpress Strep A test utilizes an automated real-time polymerase chain reaction (PCR) to detect Streptococcus pyogenes DNA.       If you have any questions or concerns, please call the clinic at the number listed above.       Sincerely,      Felicity Lynch NP

## 2022-04-07 NOTE — PROGRESS NOTES
Assessment & Plan     Cough  Advised some Robitussin and that she be more aggressive with bumping up her asthma care.  Use the albuterol 4 times a day and the Flovent 2 inhalations twice a day while she gets through this viral episode    Sore throat (viral)  Strep test was negative today and she was called with this information.  Given the cough I am doubtful this is a strep infection  - Streptococcus A Rapid Screen w/Reflex to PCR - Clinic Collect  - Group A Streptococcus PCR Throat Swab    Viral URI with cough  As above                   No follow-ups on file.    Felicity Lynch NP  [facility] is a 26 year old who presents for the following health issues: cough,chest pain with cough, sore throat, hurts to swallow, started Sunday, denies fever patient is here with respiratory symptoms that started 5 days ago.  Started with a cough.  No shortness of breath no fever.  She has a sore throat and it feels like the last time she had strep throat which was about 4 years ago.  She would like to be tested for strep.  She does have underlying asthma and takes Flovent once a day and Flonase daily.  She is using her albuterol a little bit but has not increased her Flovent.  She did take her home Covid test and that was negative.  Has tried no other over-the-counter remedies.    History of Present Illness       Reason for visit:  Cough, sore throat, chest and throat hurt when I cough  Symptom onset:  1-3 days ago  Symptoms include:  Cough, sore throat  Symptom intensity:  Moderate  Symptom progression:  Worsening  Had these symptoms before:  Yes  Has tried/received treatment for these symptoms:  Yes  Previous treatment was successful:  Yes  Prior treatment description:  I ve had strep throat before with these symptoms    She eats 0-1 servings of fruits and vegetables daily.She consumes 0 sweetened beverage(s) daily.She exercises with enough effort to increase her heart rate  60 or more minutes per day.  She exercises with enough effort to increase her heart rate 4 days per week.   She is taking medications regularly.             Review of Systems         Objective    /64 (BP Location: Right arm, Patient Position: Sitting)   Pulse 85   Temp 98.1  F (36.7  C)   Wt 70.7 kg (155 lb 14.4 oz)   SpO2 98%   BMI 25.74 kg/m    Body mass index is 25.74 kg/m .  Physical Exam   Alert and oriented skin warm pink dry  TMs clear  Lungs are clear bilaterally with good air movement there is no wheezing but there is a dry cough throughout much of the visit  Oropharynx is clear there is no cervical lymphadenopathy heart regular rate rhythm

## 2022-05-29 ENCOUNTER — HEALTH MAINTENANCE LETTER (OUTPATIENT)
Age: 27
End: 2022-05-29

## 2022-06-13 ASSESSMENT — ENCOUNTER SYMPTOMS
ARTHRALGIAS: 0
PARESTHESIAS: 0
EYE PAIN: 0
CONSTIPATION: 0
MYALGIAS: 0
COUGH: 0
SORE THROAT: 0
WEAKNESS: 0
JOINT SWELLING: 0
FREQUENCY: 0
HEMATURIA: 0
CHILLS: 0
NAUSEA: 0
BREAST MASS: 0
ABDOMINAL PAIN: 0
SHORTNESS OF BREATH: 0
DYSURIA: 0
DIARRHEA: 0
HEMATOCHEZIA: 0
NERVOUS/ANXIOUS: 0
FEVER: 0
HEARTBURN: 0
HEADACHES: 0
PALPITATIONS: 0
DIZZINESS: 0

## 2022-06-14 ENCOUNTER — OFFICE VISIT (OUTPATIENT)
Dept: FAMILY MEDICINE | Facility: CLINIC | Age: 27
End: 2022-06-14
Payer: COMMERCIAL

## 2022-06-14 ENCOUNTER — TELEPHONE (OUTPATIENT)
Dept: FAMILY MEDICINE | Facility: CLINIC | Age: 27
End: 2022-06-14

## 2022-06-14 VITALS
TEMPERATURE: 98.1 F | BODY MASS INDEX: 24.99 KG/M2 | SYSTOLIC BLOOD PRESSURE: 114 MMHG | WEIGHT: 150 LBS | DIASTOLIC BLOOD PRESSURE: 70 MMHG | HEIGHT: 65 IN | HEART RATE: 84 BPM

## 2022-06-14 DIAGNOSIS — J45.990 EXERCISE-INDUCED ASTHMA: ICD-10-CM

## 2022-06-14 DIAGNOSIS — N92.6 IRREGULAR MENSES: ICD-10-CM

## 2022-06-14 DIAGNOSIS — J45.990 EXERCISE-INDUCED ASTHMA: Primary | ICD-10-CM

## 2022-06-14 DIAGNOSIS — Z00.00 WELL ADULT EXAM: Primary | ICD-10-CM

## 2022-06-14 PROCEDURE — 99395 PREV VISIT EST AGE 18-39: CPT | Performed by: FAMILY MEDICINE

## 2022-06-14 RX ORDER — ALBUTEROL SULFATE 90 UG/1
2 AEROSOL, METERED RESPIRATORY (INHALATION) EVERY 4 HOURS PRN
Qty: 18 G | Refills: 11 | Status: SHIPPED | OUTPATIENT
Start: 2022-06-14 | End: 2023-03-02

## 2022-06-14 RX ORDER — FLUTICASONE PROPIONATE 110 UG/1
2 AEROSOL, METERED RESPIRATORY (INHALATION) 2 TIMES DAILY
Qty: 12 G | Refills: 11 | Status: SHIPPED | OUTPATIENT
Start: 2022-06-14 | End: 2023-03-02

## 2022-06-14 RX ORDER — FLUTICASONE PROPIONATE 50 MCG
1-2 SPRAY, SUSPENSION (ML) NASAL DAILY
Qty: 18.2 ML | Refills: 11 | Status: SHIPPED | OUTPATIENT
Start: 2022-06-14 | End: 2023-03-02

## 2022-06-14 ASSESSMENT — ENCOUNTER SYMPTOMS
CONSTIPATION: 0
HEADACHES: 0
FREQUENCY: 0
DIZZINESS: 0
DYSURIA: 0
DIARRHEA: 0
HEMATOCHEZIA: 0
JOINT SWELLING: 0
PALPITATIONS: 0
NAUSEA: 0
HEARTBURN: 0
HEMATURIA: 0
BREAST MASS: 0
WEAKNESS: 0
COUGH: 0
ARTHRALGIAS: 0
SHORTNESS OF BREATH: 0
FEVER: 0
PARESTHESIAS: 0
SORE THROAT: 0
EYE PAIN: 0
CHILLS: 0
MYALGIAS: 0
NERVOUS/ANXIOUS: 0
ABDOMINAL PAIN: 0

## 2022-06-14 ASSESSMENT — ASTHMA QUESTIONNAIRES: ACT_TOTALSCORE: 20

## 2022-06-14 NOTE — TELEPHONE ENCOUNTER
Flovent is covered but $220. Alternative would be QVAR but won't know if cheaper until it gets sent in.

## 2022-06-14 NOTE — TELEPHONE ENCOUNTER
Reason for Call:  Other prescription    Detailed comments: Patient's Rx of Fluticasone inhaler is not covered by her insurance.  Insurance mentioned medications covered are: Advair Brio, Symbicort.    Phone Number Patient can be reached at: Home number on file [phone] (home)    Best Time: before 2 if possible (patient goes to work at this time)    Can we leave a detailed message on this number? YES    Call taken on 6/14/2022 at 12:25 PM by CHICA ZEPEDA

## 2022-06-14 NOTE — PROGRESS NOTES
SUBJECTIVE:   CC: Chasity Mendoza is an 27 year old woman who presents for preventive health visit.     Overall doing well.  She continues to vigorously competitively.  She is living with her parents now after finishing college while she looks for job.  She has never been sexually active.  She uses her inhaler daily as it helps with her skating.  She still uses albuterol as well  Patient has been advised of split billing requirements and indicates understanding: Yes     Healthy Habits:     Getting at least 3 servings of Calcium per day:  Yes    Bi-annual eye exam:  NO    Dental care twice a year:  Yes    Sleep apnea or symptoms of sleep apnea:  None    Diet:  Regular (no restrictions)    Frequency of exercise:  4-5 days/week    Duration of exercise:  Greater than 60 minutes    Taking medications regularly:  Yes    Medication side effects:  None    PHQ-2 Total Score: 0    Additional concerns today:  No    Would like try another ocp - does not like [facility]. Hx of [place] and prefers.     Last pap done elsewhere in 2018 and was told to follow up in 5 years.     Also look at R 3rd toe - s/p surgery in December when living on [facility].           Today's PHQ-2 Score:   PHQ-2 ( 1999 Pfizer) 6/13/2022   Q1: Little interest or pleasure in doing things 0   Q2: Feeling down, depressed or hopeless 0   PHQ-2 Score 0   Q1: Little interest or pleasure in doing things Not at all   Q2: Feeling down, depressed or hopeless Not at all   PHQ-2 Score 0       Abuse: Current or Past (Physical, Sexual or Emotional) - No  Do you feel safe in your environment? Yes    Have you ever done Advance Care Planning? (For example, a Health Directive, POLST, or a discussion with a medical provider or your loved ones about your wishes): No, advance care planning information given to patient to review.  Patient declined advance care planning discussion at this time.    Social History     Tobacco Use     Smoking status: Never Smoker     Smokeless  tobacco: Never Used   Substance Use Topics     Alcohol use: Yes     Comment: on occasion     If you drink alcohol do you typically have >3 drinks per day or >7 drinks per week? No    Alcohol Use 6/13/2022   Prescreen: >3 drinks/day or >7 drinks/week? No       Reviewed orders with patient.  Reviewed health maintenance and updated orders accordingly - Yes      Breast Cancer Screening:    Breast CA Risk Assessment (FHS-7) 6/13/2022   Do you have a family history of breast, colon, or ovarian cancer? No / Unknown           Pertinent mammograms are reviewed under the imaging tab.    History of abnormal Pap smear: No Pap smear every 5 years Next due 2023     Reviewed and updated as needed this visit by clinical staff   Tobacco  Allergies  Meds                Reviewed and updated as needed this visit by Provider                   Patient Active Problem List   Diagnosis     Exercise-induced asthma     Acne vulgaris     Irregular menses     Current Outpatient Medications   Medication     albuterol (PROAIR HFA/PROVENTIL HFA/VENTOLIN HFA) 108 (90 Base) MCG/ACT inhaler     clobetasol (TEMOVATE) 0.05 % external solution     drospirenone-ethinyl estradiol (EVELYN) 3-0.03 MG tablet     fluticasone (FLONASE) 50 MCG/ACT nasal spray     fluticasone (FLOVENT HFA) 110 MCG/ACT inhaler     ketoconazole (NIZORAL) 2 % external shampoo     No current facility-administered medications for this visit.     Past Surgical History:   Procedure Laterality Date     MYRINGOTOMY, INSERT TUBE(S), ADENOIDECTOMY, COMBINED      1997     ORTHOPEDIC SURGERY  12/08/2021    R foot, 3rd toe corn removed     REMOVAL OF NAIL BED  01/11/2010    Right great toe, lateral border.     TONSILLECTOMY & ADENOIDECTOMY      2000     VARICELLA ZOSTER YAZMIN IGG (QUEST)      1997         Review of Systems   Constitutional: Negative for chills and fever.   HENT: Negative for congestion, ear pain, hearing loss and sore throat.    Eyes: Negative for pain and visual  "disturbance.   Respiratory: Negative for cough and shortness of breath.    Cardiovascular: Negative for chest pain, palpitations and peripheral edema.   Gastrointestinal: Negative for abdominal pain, constipation, diarrhea, heartburn, hematochezia and nausea.   Breasts:  Negative for tenderness, breast mass and discharge.   Genitourinary: Negative for dysuria, frequency, genital sores, hematuria, pelvic pain, urgency, vaginal bleeding and vaginal discharge.   Musculoskeletal: Negative for arthralgias, joint swelling and myalgias.   Skin: Negative for rash.   Neurological: Negative for dizziness, weakness, headaches and paresthesias.   Psychiatric/Behavioral: Negative for mood changes. The patient is not nervous/anxious.           OBJECTIVE:   /70 (BP Location: Right arm, Patient Position: Sitting, Cuff Size: Adult Regular)   Pulse 84   Temp 98.1  F (36.7  C) (Tympanic)   Ht 1.651 m (5' 5\")   Wt 68 kg (150 lb)   LMP 06/14/2022 (Exact Date)   BMI 24.96 kg/m    Physical Exam  GENERAL: healthy, alert and no distress  EYES: Eyes grossly normal to inspection, PERRL and conjunctivae and sclerae normal  HENT: ear canals and TM's normal, nose and mouth without ulcers or lesions  NECK: no adenopathy, no asymmetry, masses, or scars and thyroid normal to palpation  RESP: lungs clear to auscultation - no rales, rhonchi or wheezes  CV: regular rate and rhythm, normal S1 S2, no S3 or S4, no murmur, click or rub, no peripheral edema and peripheral pulses strong  ABDOMEN: soft, nontender, no hepatosplenomegaly, no masses and bowel sounds normal  MS: no gross musculoskeletal defects noted, no edema  SKIN: no suspicious lesions or rashes  NEURO: Normal strength and tone, mentation intact and speech normal  PSYCH: mentation appears normal, affect normal/bright        ASSESSMENT/PLAN:       ICD-10-CM    1. Well adult exam  Z00.00    2. Exercise-induced asthma  J45.990    3. Irregular menses  N92.6    Overall doing well " "Healthcare maintenance reviewed recheck in a year        COUNSELING:  Reviewed preventive health counseling, as reflected in patient instructions    Estimated body mass index is 24.96 kg/m  as calculated from the following:    Height as of this encounter: 1.651 m (5' 5\").    Weight as of this encounter: 68 kg (150 lb).        She reports that she has never smoked. She has never used smokeless tobacco.      Counseling Resources:  ATP IV Guidelines  Pooled Cohorts Equation Calculator  Breast Cancer Risk Calculator  BRCA-Related Cancer Risk Assessment: FHS-7 Tool  FRAX Risk Assessment  ICSI Preventive Guidelines  Dietary Guidelines for Americans, 2010  USDA's MyPlate  ASA Prophylaxis  Lung CA Screening    Zaki Vieira MD  [facility]  "

## 2022-09-23 ENCOUNTER — TELEPHONE (OUTPATIENT)
Dept: FAMILY MEDICINE | Facility: CLINIC | Age: 27
End: 2022-09-23

## 2022-09-23 DIAGNOSIS — J45.990 EXERCISE-INDUCED ASTHMA: ICD-10-CM

## 2022-09-23 NOTE — TELEPHONE ENCOUNTER
Reason for Call:  Medication or medication refill:    Do you use a [facility]?  Name of the pharmacy and phone number for the current request:  [id number] - [place], [place] - [address] [facility] [address]    Name of the medication requested: QVAR REDIHALER 40 MCG/ACT inhaler - only 4 day supply left       Other request: No    Can we leave a detailed message on this number? YES    Phone number patient can be reached at: Cell number on file:    Telephone Information:   Mobile [phone]       Best Time: Anytime     Call taken on 9/23/2022 at 5:46 PM by Valentina Null     N/A

## 2022-09-26 NOTE — TELEPHONE ENCOUNTER
Prescription approved per [facility] Refill Protocol.    Last Written Prescription Date: 6/14/22  Last Fill Quantity: 10.6gm,  # refills: 11   Last office visit: 6/14/2022 with prescribing provider

## 2022-10-03 ENCOUNTER — HEALTH MAINTENANCE LETTER (OUTPATIENT)
Age: 27
End: 2022-10-03

## 2023-02-23 DIAGNOSIS — Z30.9 CONTRACEPTIVE MANAGEMENT: Primary | ICD-10-CM

## 2023-02-23 NOTE — TELEPHONE ENCOUNTER
Patient has a preventative visit with [facility] (Dr. Vieira) on 3.2.23.    Patient needs Rx by Saturday, 2.25.23.

## 2023-02-24 RX ORDER — DROSPIRENONE AND ETHINYL ESTRADIOL 0.03MG-3MG
1 KIT ORAL DAILY
Qty: 28 TABLET | Refills: 0 | Status: SHIPPED | OUTPATIENT
Start: 2023-02-24 | End: 2023-03-01

## 2023-02-24 NOTE — TELEPHONE ENCOUNTER
"    Last office visit: 6/14/2022     Future Appointments 2/24/2023 - 8/23/2023      Date Visit Type Length Department Provider     3/2/2023  7:00 AM PREVENTATIVE ADULT 20 min RVFL FP/IM/Zaki Oliver MD    Location Instructions:     [facility] is [facility] at [facility], one block north of [facility] and the [facility]. The clinic shares a building with the [employer]; use the clinic s parking lot and entrance on the building s south side.                    Requested Prescriptions   Pending Prescriptions Disp Refills     drospirenone-ethinyl estradiol (EVELYN) 3-0.03 MG tablet       Sig: Take 1 tablet by mouth daily       Contraceptives Protocol Passed - 2/23/2023  5:19 PM        Passed - Patient is not a current smoker if age is 35 or older        Passed - Recent (12 mo) or future (30 days) visit within the authorizing provider's specialty     Patient has had an office visit with the authorizing provider or a provider within the authorizing providers department within the previous 12 mos or has a future within next 30 days. See \"Patient Info\" tab in inbasket, or \"Choose Columns\" in Meds & Orders section of the refill encounter.              Passed - Medication is active on med list        Passed - No active pregnancy on record        Passed - No positive pregnancy test in past 12 months                   "

## 2023-03-01 ASSESSMENT — ENCOUNTER SYMPTOMS
PALPITATIONS: 0
ABDOMINAL PAIN: 0
JOINT SWELLING: 0
CONSTIPATION: 0
NAUSEA: 0
FREQUENCY: 0
WEAKNESS: 0
HEMATURIA: 0
DYSURIA: 0
HEADACHES: 0
PARESTHESIAS: 0
NERVOUS/ANXIOUS: 0
CHILLS: 0
HEMATOCHEZIA: 0
HEARTBURN: 0
BREAST MASS: 0
COUGH: 0
DIZZINESS: 0
SHORTNESS OF BREATH: 0
EYE PAIN: 0
SORE THROAT: 0
ARTHRALGIAS: 0
DIARRHEA: 0
MYALGIAS: 0
FEVER: 0

## 2023-03-01 ASSESSMENT — ASTHMA QUESTIONNAIRES
QUESTION_3 LAST FOUR WEEKS HOW OFTEN DID YOUR ASTHMA SYMPTOMS (WHEEZING, COUGHING, SHORTNESS OF BREATH, CHEST TIGHTNESS OR PAIN) WAKE YOU UP AT NIGHT OR EARLIER THAN USUAL IN THE MORNING: NOT AT ALL
ACT_TOTALSCORE: 22
QUESTION_4 LAST FOUR WEEKS HOW OFTEN HAVE YOU USED YOUR RESCUE INHALER OR NEBULIZER MEDICATION (SUCH AS ALBUTEROL): TWO OR THREE TIMES PER WEEK
QUESTION_5 LAST FOUR WEEKS HOW WOULD YOU RATE YOUR ASTHMA CONTROL: COMPLETELY CONTROLLED
QUESTION_1 LAST FOUR WEEKS HOW MUCH OF THE TIME DID YOUR ASTHMA KEEP YOU FROM GETTING AS MUCH DONE AT WORK, SCHOOL OR AT HOME: NONE OF THE TIME
QUESTION_2 LAST FOUR WEEKS HOW OFTEN HAVE YOU HAD SHORTNESS OF BREATH: ONCE OR TWICE A WEEK
ACT_TOTALSCORE: 22

## 2023-03-02 ENCOUNTER — OFFICE VISIT (OUTPATIENT)
Dept: FAMILY MEDICINE | Facility: CLINIC | Age: 28
End: 2023-03-02
Payer: COMMERCIAL

## 2023-03-02 VITALS
TEMPERATURE: 98.9 F | BODY MASS INDEX: 24.66 KG/M2 | SYSTOLIC BLOOD PRESSURE: 118 MMHG | HEART RATE: 76 BPM | WEIGHT: 148 LBS | DIASTOLIC BLOOD PRESSURE: 72 MMHG | HEIGHT: 65 IN

## 2023-03-02 DIAGNOSIS — L21.9 SEBORRHEIC DERMATITIS OF SCALP: ICD-10-CM

## 2023-03-02 DIAGNOSIS — Z00.00 WELL ADULT EXAM: Primary | ICD-10-CM

## 2023-03-02 DIAGNOSIS — J45.990 EXERCISE-INDUCED ASTHMA: ICD-10-CM

## 2023-03-02 DIAGNOSIS — Z30.9 ENCOUNTER FOR CONTRACEPTIVE MANAGEMENT, UNSPECIFIED TYPE: ICD-10-CM

## 2023-03-02 DIAGNOSIS — N89.8 VAGINAL ITCHING: ICD-10-CM

## 2023-03-02 LAB
CLUE CELLS: ABNORMAL
TRICHOMONAS, WET PREP: ABNORMAL
WBC'S/HIGH POWER FIELD, WET PREP: ABNORMAL
YEAST, WET PREP: ABNORMAL

## 2023-03-02 PROCEDURE — 99395 PREV VISIT EST AGE 18-39: CPT | Performed by: FAMILY MEDICINE

## 2023-03-02 PROCEDURE — G0145 SCR C/V CYTO,THINLAYER,RESCR: HCPCS | Performed by: FAMILY MEDICINE

## 2023-03-02 PROCEDURE — 87210 SMEAR WET MOUNT SALINE/INK: CPT | Mod: QW | Performed by: FAMILY MEDICINE

## 2023-03-02 RX ORDER — FLUTICASONE PROPIONATE 50 MCG
1-2 SPRAY, SUSPENSION (ML) NASAL DAILY
Qty: 18.2 ML | Refills: 11 | Status: SHIPPED | OUTPATIENT
Start: 2023-03-02 | End: 2024-02-22

## 2023-03-02 RX ORDER — ALBUTEROL SULFATE 90 UG/1
2 AEROSOL, METERED RESPIRATORY (INHALATION) EVERY 4 HOURS PRN
Qty: 18 G | Refills: 11 | Status: SHIPPED | OUTPATIENT
Start: 2023-03-02 | End: 2024-02-22

## 2023-03-02 RX ORDER — CLOBETASOL PROPIONATE 0.5 MG/ML
SOLUTION TOPICAL PRN
Qty: 50 ML | Refills: 11 | Status: SHIPPED | OUTPATIENT
Start: 2023-03-02 | End: 2024-02-22

## 2023-03-02 RX ORDER — DROSPIRENONE AND ETHINYL ESTRADIOL 0.03MG-3MG
1 KIT ORAL DAILY
Qty: 84 TABLET | Refills: 4 | Status: SHIPPED | OUTPATIENT
Start: 2023-03-02 | End: 2023-03-22

## 2023-03-02 ASSESSMENT — ENCOUNTER SYMPTOMS
HEARTBURN: 0
PALPITATIONS: 0
CHILLS: 0
HEADACHES: 0
ABDOMINAL PAIN: 0
CONSTIPATION: 0
SHORTNESS OF BREATH: 0
EYE PAIN: 0
FREQUENCY: 0
NAUSEA: 0
HEMATURIA: 0
DIZZINESS: 0
HEMATOCHEZIA: 0
FEVER: 0
ARTHRALGIAS: 0
JOINT SWELLING: 0
NERVOUS/ANXIOUS: 0
COUGH: 0
SORE THROAT: 0
PARESTHESIAS: 0
WEAKNESS: 0
MYALGIAS: 0
DYSURIA: 0
DIARRHEA: 0
BREAST MASS: 0

## 2023-03-02 NOTE — PROGRESS NOTES
SUBJECTIVE:   CC: Chasity is an 27 year old who presents for preventive health visit.  Overall doing well.  She is working at the [employer].  Trying to get a job in her college degree.  She is skating competitively.  She has never been sexually active.  She notes occasional vaginal itching that responds to Monistat.  Also notes that she has had some TMJ discomfort on the left over the last several weeks that is improving.  Patient has been advised of split billing requirements and indicates understanding: Yes     Healthy Habits:     Getting at least 3 servings of Calcium per day:  Yes    Bi-annual eye exam:  NO    Dental care twice a year:  Yes    Sleep apnea or symptoms of sleep apnea:  None    Diet:  Regular (no restrictions)    Frequency of exercise:  4-5 days/week    Duration of exercise:  Greater than 60 minutes    Taking medications regularly:  Yes    Medication side effects:  None    PHQ-2 Total Score: 1    Additional concerns today:  Yes      Today's PHQ-2 Score:   PHQ-2 ( 1999 Pfizer) 3/1/2023   Q1: Little interest or pleasure in doing things 0   Q2: Feeling down, depressed or hopeless 1   PHQ-2 Score 1   Q1: Little interest or pleasure in doing things Not at all   Q2: Feeling down, depressed or hopeless Several days   PHQ-2 Score 1           Social History     Tobacco Use     Smoking status: Never     Smokeless tobacco: Never   Substance Use Topics     Alcohol use: Yes     Comment: on occasion         Alcohol Use 3/2/2023   Prescreen: >3 drinks/day or >7 drinks/week? No       Reviewed orders with patient.  Reviewed health maintenance and updated orders accordingly - Yes      Breast Cancer Screening:    Breast CA Risk Assessment (FHS-7) 6/13/2022   Do you have a family history of breast, colon, or ovarian cancer? No / Unknown           Pertinent mammograms are reviewed under the imaging tab.    History of abnormal Pap smear:      Reviewed and updated as needed this visit by clinical staff   Tobacco   "Allergies  Meds              Reviewed and updated as needed this visit by Provider                     Review of Systems   Constitutional: Negative for chills and fever.   HENT: Negative for congestion, ear pain, hearing loss and sore throat.    Eyes: Negative for pain and visual disturbance.   Respiratory: Negative for cough and shortness of breath.    Cardiovascular: Negative for chest pain, palpitations and peripheral edema.   Gastrointestinal: Negative for abdominal pain, constipation, diarrhea, heartburn, hematochezia and nausea.   Breasts:  Negative for tenderness, breast mass and discharge.   Genitourinary: Negative for dysuria, frequency, genital sores, hematuria, pelvic pain, urgency, vaginal bleeding and vaginal discharge.   Musculoskeletal: Negative for arthralgias, joint swelling and myalgias.   Skin: Negative for rash.   Neurological: Negative for dizziness, weakness, headaches and paresthesias.   Psychiatric/Behavioral: Negative for mood changes. The patient is not nervous/anxious.           OBJECTIVE:   /72 (BP Location: Right arm, Patient Position: Sitting, Cuff Size: Adult Regular)   Pulse 76   Temp 98.9  F (37.2  C) (Temporal)   Ht 1.651 m (5' 5\")   Wt 67.1 kg (148 lb)   LMP 02/21/2023 (Approximate)   BMI 24.63 kg/m    Physical Exam  GENERAL: healthy, alert and no distress  EYES: Eyes grossly normal to inspection, PERRL and conjunctivae and sclerae normal  HENT: ear canals and TM's normal,   Mild left TMJ tenderness  NECK: no adenopathy, no asymmetry, masses, or scars and thyroid normal to palpation  RESP: lungs clear to auscultation - no rales, rhonchi or wheezes  CV: regular rate and rhythm, normal S1 S2, no S3 or S4, no murmur, click or rub, no peripheral edema and peripheral pulses strong  ABDOMEN: soft, nontender, no hepatosplenomegaly, no masses and bowel sounds normal   (female): normal female external genitalia, normal urethral meatus, vaginal mucosa pink, moist, well " rugated, and normal cervix  MS: no gross musculoskeletal defects noted, no edema  SKIN: no suspicious lesions or rashes  NEURO: Normal strength and tone, mentation intact and speech normal  PSYCH: mentation appears normal, affect normal/bright        ASSESSMENT/PLAN:   (Z00.00) Well adult exam  (primary encounter diagnosis)  Comment: Healthcare maintenance reviewed she did note some mild discomfort in her left TMJ that is been there for little over a month is getting a lot better.  Is worse if she eats hard foods we discussed going to soft diet let us know if it does not improve  Plan:     (Z30.9) Encounter for contraceptive management, unspecified type  Comment: Oral contraceptives reviewed she did note some vaginal itching occasionally that she uses Monistat for no signs of yeast today.  Plan: drospirenone-ethinyl estradiol (EVELYN) 3-0.03         MG tablet            (J45.990) Exercise-induced asthma  Comment: Stable.  She is an avid skater and needs it for this that she is gets 4 days a week  Plan: beclomethasone HFA (QVAR REDIHALER) 40 MCG/ACT         inhaler, fluticasone (FLONASE) 50 MCG/ACT nasal        spray, albuterol (PROAIR HFA/PROVENTIL         HFA/VENTOLIN HFA) 108 (90 Base) MCG/ACT inhaler            (L21.9) Seborrheic dermatitis of scalp  Comment: Well-controlled  Plan: clobetasol (TEMOVATE) 0.05 % external solution                      COUNSELING:  Reviewed preventive health counseling, as reflected in patient instructions        She reports that she has never smoked. She has never used smokeless tobacco.      Zaki Vieira MD  [facility]

## 2023-03-02 NOTE — LETTER
March 2, 2023      Chasity SANTOS Reji  [address] [facility] [address]        Dear ,    We are writing to inform you of your test results.    Your test results fall within the expected range(s) or remain unchanged from previous results.  Please continue with current treatment plan.    Resulted Orders   Wet prep - Clinic Collect   Result Value Ref Range    Trichomonas Absent Absent    Yeast Absent Absent    Clue Cells Absent Absent    WBCs/high power field 3+ (A) None       If you have any questions or concerns, please call the clinic at the number listed above.       Sincerely,      Zaki Vieira MD

## 2023-03-06 LAB
BKR LAB AP GYN ADEQUACY: NORMAL
BKR LAB AP GYN INTERPRETATION: NORMAL
BKR LAB AP HPV REFLEX: NORMAL
BKR LAB AP LMP: NORMAL
BKR LAB AP PREVIOUS ABNORMAL: NORMAL
PATH REPORT.COMMENTS IMP SPEC: NORMAL
PATH REPORT.COMMENTS IMP SPEC: NORMAL
PATH REPORT.RELEVANT HX SPEC: NORMAL

## 2023-09-11 DIAGNOSIS — Z30.9 ENCOUNTER FOR CONTRACEPTIVE MANAGEMENT, UNSPECIFIED TYPE: ICD-10-CM

## 2023-09-11 RX ORDER — DROSPIRENONE AND ETHINYL ESTRADIOL 0.03MG-3MG
1 KIT ORAL DAILY
Qty: 84 TABLET | Refills: 2 | OUTPATIENT
Start: 2023-09-11

## 2023-11-30 DIAGNOSIS — Z30.9 ENCOUNTER FOR CONTRACEPTIVE MANAGEMENT, UNSPECIFIED TYPE: ICD-10-CM

## 2023-11-30 NOTE — TELEPHONE ENCOUNTER
General Call    Contacts         Type Contact Phone/Fax    11/30/2023 08:17 AM CST Phone (Incoming) [facility] (Self) [phone] (H)          Reason for Call:  Patient requesting a refill on drospirenone-ethinyl estradiol (EVELYN) 3-0.03 MG tablet.

## 2023-12-01 NOTE — TELEPHONE ENCOUNTER
Patient calling to check status of Rx; she will need it early next week as she will be out.    Patient would appreciate it if script could be sent to the [facility] in [place].

## 2023-12-04 RX ORDER — DROSPIRENONE AND ETHINYL ESTRADIOL 0.03MG-3MG
1 KIT ORAL DAILY
Qty: 84 TABLET | Refills: 0 | Status: SHIPPED | OUTPATIENT
Start: 2023-12-04 | End: 2024-02-22

## 2024-02-21 SDOH — HEALTH STABILITY: PHYSICAL HEALTH: ON AVERAGE, HOW MANY MINUTES DO YOU ENGAGE IN EXERCISE AT THIS LEVEL?: 60 MIN

## 2024-02-21 SDOH — HEALTH STABILITY: PHYSICAL HEALTH: ON AVERAGE, HOW MANY DAYS PER WEEK DO YOU ENGAGE IN MODERATE TO STRENUOUS EXERCISE (LIKE A BRISK WALK)?: 6 DAYS

## 2024-02-21 ASSESSMENT — ASTHMA QUESTIONNAIRES
QUESTION_4 LAST FOUR WEEKS HOW OFTEN HAVE YOU USED YOUR RESCUE INHALER OR NEBULIZER MEDICATION (SUCH AS ALBUTEROL): ONE OR TWO TIMES PER DAY
QUESTION_2 LAST FOUR WEEKS HOW OFTEN HAVE YOU HAD SHORTNESS OF BREATH: ONCE OR TWICE A WEEK
ACT_TOTALSCORE: 21
QUESTION_3 LAST FOUR WEEKS HOW OFTEN DID YOUR ASTHMA SYMPTOMS (WHEEZING, COUGHING, SHORTNESS OF BREATH, CHEST TIGHTNESS OR PAIN) WAKE YOU UP AT NIGHT OR EARLIER THAN USUAL IN THE MORNING: NOT AT ALL
QUESTION_1 LAST FOUR WEEKS HOW MUCH OF THE TIME DID YOUR ASTHMA KEEP YOU FROM GETTING AS MUCH DONE AT WORK, SCHOOL OR AT HOME: NONE OF THE TIME
ACT_TOTALSCORE: 21
QUESTION_5 LAST FOUR WEEKS HOW WOULD YOU RATE YOUR ASTHMA CONTROL: COMPLETELY CONTROLLED

## 2024-02-21 ASSESSMENT — SOCIAL DETERMINANTS OF HEALTH (SDOH): HOW OFTEN DO YOU GET TOGETHER WITH FRIENDS OR RELATIVES?: ONCE A WEEK

## 2024-02-21 NOTE — COMMUNITY RESOURCES LIST (ENGLISH)
02/21/2024   [facility]  N/A  For questions about this resource list or additional care needs, please contact your primary care clinic or care manager.  Phone: [phone]   Email: N/A   Address: [address] [place] [address]   Hours: N/A        Housing       Housing search assistance  1  [facility] Office - Housing Stability Services Distance: 13.36 miles      In-Person, Phone/Virtual   [address] [place] [address]  Language: English  Hours: Mon - Thu 8:00 AM - 4:00 PM , Fri Appt. Only  Fees: Free   Phone: (975) 956-5733 Email: info@[employer] Website: http://[employer]     2  [facility] Distance: 13.56 miles      In-Person, Phone/Virtual   [address] [place] [address]  Language: English  Hours: Mon - Fri 10:00 AM - 4:00 PM  Fees: Free   Phone: (322) 439-8251 Email: juan carlosmeme@[employer] Website: http://www.[facility].org     Shelter for families  3  [facility] Distance: 13.67 miles      In-Person   [address] [place] [address]  Language: English  Hours: Mon - Sun Open 24 Hours  Fees: Free, Self Pay   Phone: (637) 918-1542 Email: Valarie@[facility].org Website: http://www.[facility].org/     4  [facility] Distance: 21.81 miles      In-Person   [address] [address]  Language: English  Hours: Mon - Sun Open 24 Hours  Fees: Free   Phone: (131) 774-9321 Email: Eladio@[facility] Center[facility].org Website: [email or web address].org/[facility]/     Shelter for individuals  5  [facility] Distance: 13.67 miles      In-Person   [address] [place] [address]  Language: English  Hours: Mon - Sun Open 24 Hours  Fees: Free   Phone: (884) 970-1642 Email: Valarie@[facility].[employer].org Website: http://www.[employer].org/     6  [facility] Distance: 24.17 miles      In-Person   [address] [place] [address]  Language: English  Hours: Mon - Thu 8:30 AM - 5:00 PM , Fri 8:30 AM - 4:00 PM  Fees: Free   Phone: (970) 680-9597 Email: info@[employer] Website: https://www.[facility]/          Important Numbers & Websites       Emergency Services   911  [facility]   311  Poison Control   (303) 316-1584  Suicide Prevention Lifeline   (579) 801-7034 (TALK)  Child Abuse Hotline   (193) 681-7415 (4-A-Child)  Sexual Assault Hotline   (136) 850-1941 (HOPE)  National Runaway Safeline   (238) 769-6718 (RUNAWAY)  All-Options Talkline   (293) 260-5949  Substance Abuse Referral   (981) 818-1318 (HELP)

## 2024-02-22 ENCOUNTER — OFFICE VISIT (OUTPATIENT)
Dept: FAMILY MEDICINE | Facility: CLINIC | Age: 29
End: 2024-02-22
Payer: COMMERCIAL

## 2024-02-22 VITALS
DIASTOLIC BLOOD PRESSURE: 68 MMHG | RESPIRATION RATE: 14 BRPM | HEART RATE: 75 BPM | SYSTOLIC BLOOD PRESSURE: 118 MMHG | BODY MASS INDEX: 23.61 KG/M2 | WEIGHT: 146.9 LBS | HEIGHT: 66 IN | TEMPERATURE: 98.3 F | OXYGEN SATURATION: 99 %

## 2024-02-22 DIAGNOSIS — L21.9 SEBORRHEIC DERMATITIS OF SCALP: ICD-10-CM

## 2024-02-22 DIAGNOSIS — N92.6 IRREGULAR MENSES: ICD-10-CM

## 2024-02-22 DIAGNOSIS — Z00.00 WELL ADULT EXAM: Primary | ICD-10-CM

## 2024-02-22 DIAGNOSIS — Z30.9 ENCOUNTER FOR CONTRACEPTIVE MANAGEMENT, UNSPECIFIED TYPE: ICD-10-CM

## 2024-02-22 DIAGNOSIS — J45.990 EXERCISE-INDUCED ASTHMA: ICD-10-CM

## 2024-02-22 PROCEDURE — 99395 PREV VISIT EST AGE 18-39: CPT | Performed by: FAMILY MEDICINE

## 2024-02-22 RX ORDER — DROSPIRENONE AND ETHINYL ESTRADIOL 0.03MG-3MG
1 KIT ORAL DAILY
Qty: 84 TABLET | Refills: 0 | Status: SHIPPED | OUTPATIENT
Start: 2024-02-22 | End: 2024-05-13

## 2024-02-22 RX ORDER — ALBUTEROL SULFATE 90 UG/1
2 AEROSOL, METERED RESPIRATORY (INHALATION) EVERY 4 HOURS PRN
Qty: 18 G | Refills: 11 | Status: SHIPPED | OUTPATIENT
Start: 2024-02-22

## 2024-02-22 RX ORDER — FLUTICASONE PROPIONATE 50 MCG
1-2 SPRAY, SUSPENSION (ML) NASAL DAILY
Qty: 18.2 ML | Refills: 11 | Status: SHIPPED | OUTPATIENT
Start: 2024-02-22

## 2024-02-22 RX ORDER — CLOBETASOL PROPIONATE 0.5 MG/ML
SOLUTION TOPICAL PRN
Qty: 50 ML | Refills: 11 | Status: SHIPPED | OUTPATIENT
Start: 2024-02-22

## 2024-02-22 RX ORDER — KETOCONAZOLE 20 MG/ML
SHAMPOO TOPICAL DAILY PRN
Qty: 120 ML | Refills: 5 | Status: SHIPPED | OUTPATIENT
Start: 2024-02-22

## 2024-02-22 NOTE — PROGRESS NOTES
Preventive Care Visit  [facility]  Zaki Vieira MD, Family Medicine  Feb 22, 2024       SUBJECTIVE:   Chasity is a 28 year old, presenting for the following: Overall doing well.  Continues to live with her parents.  She continues to skate competitively.  She teaches skating as well.  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  She has been able to find her job in her field.  She has never been sexually active.  No change in family history no family history of any early breast or colon cancer is no family history of early heart disease    Physical        2/22/2024     7:25 AM   Additional Questions   Roomed by Pretty   Accompanied by n/a         2/22/2024     7:25 AM   Patient Reported Additional Medications   Patient reports taking the following new medications none     HPI    Today's PHQ-2 Score:       2/21/2024     8:00 AM   PHQ-2 ( 1999 [employer])   Q1: Little interest or pleasure in doing things 0   Q2: Feeling down, depressed or hopeless 0   PHQ-2 Score 0   Q1: Little interest or pleasure in doing things Not at all   Q2: Feeling down, depressed or hopeless Not at all   PHQ-2 Score 0         Patient Active Problem List   Diagnosis    Exercise-induced asthma    Acne vulgaris    Irregular menses     Current Outpatient Medications   Medication    albuterol (PROAIR HFA/PROVENTIL HFA/VENTOLIN HFA) 108 (90 Base) MCG/ACT inhaler    beclomethasone HFA (QVAR REDIHALER) 40 MCG/ACT inhaler    clobetasol (TEMOVATE) 0.05 % external solution    drospirenone-ethinyl estradiol (EVELYN) 3-0.03 MG tablet    fluticasone (FLONASE) 50 MCG/ACT nasal spray    ketoconazole (NIZORAL) 2 % external shampoo     No current facility-administered medications for this visit.       Social History     Tobacco Use    Smoking status: Never    Smokeless tobacco: Never   Substance Use Topics    Alcohol use: Yes     Comment: on occasion             2/21/2024     8:00 AM   Alcohol Use   Prescreen: >3 drinks/day or >7 drinks/week? No  "    Reviewed orders with patient.  Reviewed health maintenance and updated orders accordingly -       Breast Cancer Screenin/13/2022     7:25 PM 2024     8:02 AM   Breast CA Risk Assessment (FHS-7)   Do you have a family history of breast, colon, or ovarian cancer? No / Unknown No / Unknown           Pertinent mammograms are reviewed under the imaging tab.    History of abnormal Pap smear: NO - age 30-65 PAP every 5 years with negative HPV co-testing recommended      3/2/2023     7:17 AM   PAP / HPV   PAP Negative for Intraepithelial Lesion or Malignancy (NILM)      Reviewed and updated as needed this visit by clinical staff   Tobacco  Allergies  Meds              Reviewed and updated as needed this visit by Provider                    Review of Systems    Review of Systems  Constitutional, HEENT, cardiovascular, pulmonary, GI, , musculoskeletal, neuro, skin, endocrine and psych systems are negative, except as otherwise noted.    OBJECTIVE:   There were no vitals taken for this visit.   Estimated body mass index is 24.63 kg/m  as calculated from the following:    Height as of 3/2/23: 1.651 m (5' 5\").    Weight as of 3/2/23: 67.1 kg (148 lb).  Physical Exam  GENERAL: alert and no distress  EYES: Eyes grossly normal to inspection, PERRL and conjunctivae and sclerae normal  HENT: ear canals and TM's normal, nose and mouth without ulcers or lesions  NECK: no adenopathy, no asymmetry, masses, or scars  RESP: lungs clear to auscultation - no rales, rhonchi or wheezes  CV: regular rate and rhythm, normal S1 S2, no S3 or S4, no murmur, click or rub, no peripheral edema  ABDOMEN: soft, nontender, no hepatosplenomegaly, no masses and bowel sounds normal  MS: no gross musculoskeletal defects noted, no edema  SKIN: no suspicious lesions or rashes  NEURO: Normal strength and tone, mentation intact and speech normal  PSYCH: mentation appears normal, affect normal/bright        ASSESSMENT/PLAN:   Well adult " exam  Healthcare maintenance reviewed    Encounter for contraceptive management, unspecified type  Doing well with he has been controlling her periods very nicely  - drospirenone-ethinyl estradiol (EVELYN) 3-0.03 MG tablet; Take 1 tablet by mouth daily    Irregular menses  As above    Seborrheic dermatitis of scalp  Controlled with meds as below  - clobetasol (TEMOVATE) 0.05 % external solution; Apply topically as needed  - ketoconazole (NIZORAL) 2 % external shampoo; Apply topically daily as needed for itching    Exercise-induced asthma  Uses albuterol before she states uses her Qvar daily as she feels like it helps her with her skating as well.  - albuterol (PROAIR HFA/PROVENTIL HFA/VENTOLIN HFA) 108 (90 Base) MCG/ACT inhaler; Inhale 2 puffs into the lungs every 4 hours as needed  - beclomethasone HFA (QVAR REDIHALER) 40 MCG/ACT inhaler; Inhale 1 puff into the lungs 2 times daily  - fluticasone (FLONASE) 50 MCG/ACT nasal spray; Spray 1-2 sprays into both nostrils daily          Counseling  Reviewed preventive health counseling, as reflected in patient instructions      She reports that she has never smoked. She has never used smokeless tobacco.          Signed Electronically by: Zaki Vieira MD

## 2024-05-13 DIAGNOSIS — Z30.9 ENCOUNTER FOR CONTRACEPTIVE MANAGEMENT, UNSPECIFIED TYPE: ICD-10-CM

## 2024-05-13 RX ORDER — DROSPIRENONE AND ETHINYL ESTRADIOL 0.03MG-3MG
1 KIT ORAL DAILY
Qty: 84 TABLET | Refills: 0 | Status: SHIPPED | OUTPATIENT
Start: 2024-05-13 | End: 2024-08-02

## 2024-05-17 DIAGNOSIS — Z30.9 ENCOUNTER FOR CONTRACEPTIVE MANAGEMENT, UNSPECIFIED TYPE: ICD-10-CM

## 2024-05-17 RX ORDER — DROSPIRENONE AND ETHINYL ESTRADIOL 0.03MG-3MG
1 KIT ORAL DAILY
Qty: 84 TABLET | Refills: 0 | OUTPATIENT
Start: 2024-05-17

## 2024-06-14 ENCOUNTER — TRANSFERRED RECORDS (OUTPATIENT)
Dept: HEALTH INFORMATION MANAGEMENT | Facility: CLINIC | Age: 29
End: 2024-06-14
Payer: COMMERCIAL

## 2024-08-01 ENCOUNTER — OFFICE VISIT (OUTPATIENT)
Dept: FAMILY MEDICINE | Facility: CLINIC | Age: 29
End: 2024-08-01
Payer: COMMERCIAL

## 2024-08-01 VITALS
OXYGEN SATURATION: 99 % | HEART RATE: 59 BPM | SYSTOLIC BLOOD PRESSURE: 110 MMHG | DIASTOLIC BLOOD PRESSURE: 66 MMHG | TEMPERATURE: 97.1 F | RESPIRATION RATE: 14 BRPM | WEIGHT: 151.3 LBS | HEIGHT: 66 IN | BODY MASS INDEX: 24.32 KG/M2

## 2024-08-01 DIAGNOSIS — K21.9 GASTROESOPHAGEAL REFLUX DISEASE WITHOUT ESOPHAGITIS: Primary | ICD-10-CM

## 2024-08-01 PROCEDURE — 99213 OFFICE O/P EST LOW 20 MIN: CPT | Performed by: FAMILY MEDICINE

## 2024-08-01 RX ORDER — PANTOPRAZOLE SODIUM 40 MG/1
40 TABLET, DELAYED RELEASE ORAL DAILY
Qty: 30 TABLET | Refills: 0 | Status: SHIPPED | OUTPATIENT
Start: 2024-08-01 | End: 2024-08-31

## 2024-08-01 NOTE — PROGRESS NOTES
Assessment & Plan     Gastroesophageal reflux disease without esophagitis  We discussed diet we will look at Protonix for the next month and switching to Pepcid if symptoms do not resolve or symptoms return on the Pepcid we will look at setting up EGD  - pantoprazole (PROTONIX) 40 MG EC tablet; Take 1 tablet (40 mg) by mouth daily for 30 days                Gladys Partida is a 29 year old, presenting for the following health issues: Patient notes for the last several months she has had issues with acid reflux.  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  But has been there since.  She notes she has regular heartburn Tums seem to help she has occasionally food gets partially stuck.  Her dad had issues with esophageal strictures in the past.  Weights been stable.  She is been able to eat most the time fine.  No bowel or bladder issues.  Abdominal Pain (Acid reflux, food getting stuck in esophagus x 5 months)        8/1/2024     8:11 AM   Additional Questions   Roomed by ROSAURA Peterson     History of Present Illness       Reason for visit:  I ve been getting heartburn which i think may be due to acid reflux. I also have issues with food getting stuck in my throat, which i believe is what my dad had.  Symptom onset:  More than a month  Symptoms include:  Heartburn, food stuck in throat  Symptom intensity:  Moderate  Symptom progression:  Staying the same  Had these symptoms before:  No  What makes it better:  Sometimes drinking water helps the heartburn ease away.    She eats 2-3 servings of fruits and vegetables daily.She consumes 0 sweetened beverage(s) daily.She exercises with enough effort to increase her heart rate 60 or more minutes per day.  She exercises with enough effort to increase her heart rate 5 days per week.   She is taking medications regularly.                 Review of Systems  Constitutional, HEENT, cardiovascular, pulmonary, gi and gu systems are  "negative, except as otherwise noted.      Objective    /66 (BP Location: Right arm, Patient Position: Sitting, Cuff Size: Adult Regular)   Pulse 59   Temp 97.1  F (36.2  C) (Tympanic)   Resp 14   Ht 1.664 m (5' 5.5\")   Wt 68.6 kg (151 lb 4.8 oz)   SpO2 99%   BMI 24.79 kg/m    Body mass index is 24.79 kg/m .  Physical Exam   Alert no apparent distress lungs clear heart regular rhythm abdomen soft minimal epigastric tenderness no guarding rebound            Signed Electronically by: Zaki Vieira MD    "

## 2024-08-02 ENCOUNTER — MYC REFILL (OUTPATIENT)
Dept: FAMILY MEDICINE | Facility: CLINIC | Age: 29
End: 2024-08-02
Payer: COMMERCIAL

## 2024-08-02 ENCOUNTER — TRANSFERRED RECORDS (OUTPATIENT)
Dept: HEALTH INFORMATION MANAGEMENT | Facility: CLINIC | Age: 29
End: 2024-08-02
Payer: COMMERCIAL

## 2024-08-02 DIAGNOSIS — Z30.9 ENCOUNTER FOR CONTRACEPTIVE MANAGEMENT, UNSPECIFIED TYPE: ICD-10-CM

## 2024-08-02 DIAGNOSIS — K21.9 GASTROESOPHAGEAL REFLUX DISEASE WITHOUT ESOPHAGITIS: ICD-10-CM

## 2024-08-05 RX ORDER — DROSPIRENONE AND ETHINYL ESTRADIOL 0.03MG-3MG
1 KIT ORAL DAILY
Qty: 84 TABLET | Refills: 0 | Status: SHIPPED | OUTPATIENT
Start: 2024-08-05

## 2024-08-05 RX ORDER — PANTOPRAZOLE SODIUM 40 MG/1
40 TABLET, DELAYED RELEASE ORAL DAILY
Qty: 90 TABLET | OUTPATIENT
Start: 2024-08-05

## 2024-08-05 NOTE — TELEPHONE ENCOUNTER
Patient comment: Can I get a refill on this until my next yearly exam which would be around Feb 2025. I leave Wed Aug 7th for a trip and need it before then. Thank you.         Last office visit: 8/1/2024     Future Appointments 8/5/2024 - 2/1/2025      None            Requested Prescriptions   Pending Prescriptions Disp Refills    drospirenone-ethinyl estradiol (EVELYN) 3-0.03 MG tablet 84 tablet 0     Sig: Take 1 tablet by mouth daily       Contraceptives Protocol Failed - 8/5/2024 10:42 AM        Failed - Medication indicated for associated diagnosis     Medication is associated with one or more of the following diagnoses:  Contraception  Acne  Dysmenorrhea  Menorrhagia  Amenorrhea  PCOS  Premenstrual Dysphoric Disorder  Irregular menses  Endometriosis          Passed - Patient is not a current smoker if age is 35 or older        Passed - Recent (12 mo) or future (30 days) visit within the authorizing provider's specialty     The patient must have completed an in-person or virtual visit within the past 12 months or has a future visit scheduled within the next 90 days with the authorizing provider s specialty.  Urgent care and e-visits do not quality as an office visit for this protocol.          Passed - Medication is active on med list        Passed - No active pregnancy on record        Passed - No positive pregnancy test in past 12 months

## 2024-08-05 NOTE — TELEPHONE ENCOUNTER
08/05/2024    Pt called to check on the status of her refill request. TC advised we received her refill request on 08/02/2024 ad the turn around time is 1-3 business days. PT states she will be going out of town on Wednesday and is hoping this request can be expedited. TC advised PT that PCP is out of the office today and will be back in the office tomorrow.    Natalie Mireles

## 2024-08-29 DIAGNOSIS — K21.9 GASTROESOPHAGEAL REFLUX DISEASE WITHOUT ESOPHAGITIS: ICD-10-CM

## 2024-08-29 RX ORDER — PANTOPRAZOLE SODIUM 40 MG/1
40 TABLET, DELAYED RELEASE ORAL DAILY
Qty: 90 TABLET | OUTPATIENT
Start: 2024-08-29

## 2024-10-26 ENCOUNTER — ANCILLARY PROCEDURE (OUTPATIENT)
Dept: GENERAL RADIOLOGY | Facility: CLINIC | Age: 29
End: 2024-10-26
Attending: FAMILY MEDICINE
Payer: COMMERCIAL

## 2024-10-26 ENCOUNTER — OFFICE VISIT (OUTPATIENT)
Dept: URGENT CARE | Facility: URGENT CARE | Age: 29
End: 2024-10-26

## 2024-10-26 VITALS
OXYGEN SATURATION: 100 % | TEMPERATURE: 98 F | DIASTOLIC BLOOD PRESSURE: 66 MMHG | RESPIRATION RATE: 16 BRPM | SYSTOLIC BLOOD PRESSURE: 116 MMHG | BODY MASS INDEX: 24.09 KG/M2 | HEART RATE: 53 BPM | WEIGHT: 147 LBS

## 2024-10-26 DIAGNOSIS — Z30.9 ENCOUNTER FOR CONTRACEPTIVE MANAGEMENT, UNSPECIFIED TYPE: ICD-10-CM

## 2024-10-26 DIAGNOSIS — M77.01 MEDIAL EPICONDYLITIS OF ELBOW, RIGHT: ICD-10-CM

## 2024-10-26 DIAGNOSIS — S59.901A INJURY OF RIGHT ELBOW, INITIAL ENCOUNTER: Primary | ICD-10-CM

## 2024-10-26 PROCEDURE — 99213 OFFICE O/P EST LOW 20 MIN: CPT | Performed by: FAMILY MEDICINE

## 2024-10-26 PROCEDURE — 73080 X-RAY EXAM OF ELBOW: CPT | Mod: TC | Performed by: RADIOLOGY

## 2024-10-26 NOTE — LETTER
REPORT OF WORK COMP    Chasity Mendoza  [address] [place] [address]      PATIENT DATA    Employee Name: Chasity Mendoza      : 1995     #: [id number]    Work related injury: Yes  Employer at time of injury: [employer] in [place], [place]  Employer contact & phone:   Employed elsewhere? No  Workers' Compensation Carrier/Managed Care Plan:      Today's date: 10/26/2024  Date of injury: 10/9/2024  Date of first visit: 10/26/2024    PROVIDER EVALUATION: Please fill in as needed.  Please give copy to employee for employer.    1. Diagnosis: Right elbow contusion / right medial epicondylitis    2. Treatment: Ice / rest.  3. Medication: none  NOTE: When ordering a medication, [place] Rules require Work Comp or WC on prescriptions.      5. Return to work date: 10/9/2024   ** WITH RESTRICTIONS? Yes, with work restrictions: * Other:  Limit repetitive movement like lifting with the right arm DURATION OF LIMITATIONS: 2 weeks      RESTRICTIONS: Unlimited unless listed.  Restrictions apply to home and leisure also.  If work restrictions is not available, the employee is totally disabled.    Maximum Medical Improvement (Date): unsure  Any Permanent Partial Disability? Deferred to future exam/consult.      Medical Examiner: Sharla Pederson MD           Next appointment: 3 weeks    CC: Employer, Managed Care Plan/Payor, Patient

## 2024-10-26 NOTE — PROGRESS NOTES
Helga was seen today for elbow injury.    Diagnoses and all orders for this visit:    Injury of right elbow, initial encounter  -     XR Elbow Right G/E 3 Views    Medial epicondylitis of elbow, right    Patient reassured no fracture.  Work restriction form filled out - limit repetitive use of right arm over the next 2 weeks.  Recheck in 3 weeks.      Subjective   Chasity Mendoza is a 29 year old is presenting for the following health issues:  2 weeks ago was moving totes off truck hit right elbow just elbow is getting better. First 4-5 days would wake up from pain worse when unloading truck, waking up in AM with it sore, a little swollen       HPI : Chasity Mendoza was at her place of work October 9 and was moving plastic totes off a truck. The top tote slipped and she tried to catch it and hit tip of her right elbow on the lower tote.  It was very painful initially but did improve over time.  No open wound / ecchymoses. She woke up at night with right elbow pain for a few nights then if felt better.  Pain never went away completely.  Seems to hurt more when she is using the elbow to lift.  It seems like the activity of unloading the totes off truck which happens once a week is making the pain worse. Last few mornings has woke up with pain.  She also figure skates and some of her moves on the ice makes the elbow hurt.      She works at [employer] as  in [place], [place].  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      Review of Systems: No numbness or tingling in right arm/ hand.  No weakness in arm or hand.      Patient Active Problem List   Diagnosis    Exercise-induced asthma    Acne vulgaris    Irregular menses              Objective:  /66   Pulse 53   Temp 98  F (36.7  C) (Tympanic)   Resp 16   Wt 66.7 kg (147 lb)   SpO2 100%   BMI 24.09 kg/m   No acute distress.  Right elbow: CMS intact. No swelling / erythema or bruising.  No deformities.  She is tender around the  medial epicondyle.  Good flexion / extension of the elbow.      Results for orders placed or performed in visit on 10/26/24 (from the past 24 hours)   XR Elbow Right G/E 3 Views    Narrative    EXAM: XR ELBOW RIGHT G/E 3 VIEWS  LOCATION: [facility]  DATE: 10/26/2024    INDICATION: Struck medial condyle of right elbow 2 weeks ago and still painful  COMPARISON: None.      Impression    IMPRESSION: Normal joint spaces and alignment. No acute displaced right elbow fracture or joint effusion.           Sharla Pederson MD

## 2024-10-28 RX ORDER — DROSPIRENONE AND ETHINYL ESTRADIOL 0.03MG-3MG
1 KIT ORAL DAILY
Qty: 84 TABLET | Refills: 0 | Status: SHIPPED | OUTPATIENT
Start: 2024-10-28

## 2025-01-26 ENCOUNTER — OFFICE VISIT (OUTPATIENT)
Dept: URGENT CARE | Facility: URGENT CARE | Age: 30
End: 2025-01-26
Payer: COMMERCIAL

## 2025-01-26 ENCOUNTER — ANCILLARY PROCEDURE (OUTPATIENT)
Dept: GENERAL RADIOLOGY | Facility: CLINIC | Age: 30
End: 2025-01-26
Attending: PHYSICIAN ASSISTANT
Payer: COMMERCIAL

## 2025-01-26 VITALS
BODY MASS INDEX: 24.29 KG/M2 | HEART RATE: 69 BPM | RESPIRATION RATE: 20 BRPM | SYSTOLIC BLOOD PRESSURE: 137 MMHG | OXYGEN SATURATION: 98 % | DIASTOLIC BLOOD PRESSURE: 72 MMHG | WEIGHT: 148.2 LBS | TEMPERATURE: 98 F

## 2025-01-26 DIAGNOSIS — R05.2 SUBACUTE COUGH: Primary | ICD-10-CM

## 2025-01-26 DIAGNOSIS — J45.21 MILD INTERMITTENT ASTHMA WITH ACUTE EXACERBATION: ICD-10-CM

## 2025-01-26 DIAGNOSIS — R05.1 ACUTE COUGH: ICD-10-CM

## 2025-01-26 PROBLEM — E66.9 OBESITY: Status: ACTIVE | Noted: 2025-01-26

## 2025-01-26 PROCEDURE — 71046 X-RAY EXAM CHEST 2 VIEWS: CPT | Mod: TC | Performed by: INTERNAL MEDICINE

## 2025-01-26 PROCEDURE — 99214 OFFICE O/P EST MOD 30 MIN: CPT

## 2025-01-26 RX ORDER — BENZONATATE 200 MG/1
200 CAPSULE ORAL 3 TIMES DAILY PRN
Qty: 30 CAPSULE | Refills: 1 | Status: SHIPPED | OUTPATIENT
Start: 2025-01-26

## 2025-01-26 RX ORDER — PREDNISONE 20 MG/1
40 TABLET ORAL DAILY
Qty: 10 TABLET | Refills: 0 | Status: SHIPPED | OUTPATIENT
Start: 2025-01-26 | End: 2025-01-31

## 2025-01-26 NOTE — PROGRESS NOTES
Assessment & Plan     MDM:  Patient was seen for persistent cough which started with URI but continues to linger.  Her respiratory symptoms improved but cough lingering.  She has not had improvement with increased use of her inhaled corticosteroid.  She has not tried her albuterol.  Her exam is reassuring with normal lung sounds and no signs of secondary bacterial infection on exam.  She is vitally normal with O2 sat of 98% and no increased work of breathing retractions or accessory muscle use on exam.  X-ray reveals no signs of pneumonia, infiltrate, effusion, or cardiomegaly.  Will treat as asthma with exacerbation.  Will have her increase her inhaled corticosteroid.  New inhaler sent for 80 mcg 1 puff twice a day.  Encouraged her to use her albuterol every 4 hours as needed.  Will do a short course of oral steroids.  She was given Tessalon for cough.  Since patient feels her symptoms are worse when she is at work she wonders if it is related to dust exposure possibly.  It is possible that she has a dust mite allergy or dust is causing exacerbation of her asthma as well.  Will have her give trial of Zyrtec or Claritin daily and we will recheck at time of her wellness exam in approximately 2 to 3 weeks.      Acute cough    - XR Chest 2 Views  - benzonatate (TESSALON) 200 MG capsule  Dispense: 30 capsule; Refill: 1    Mild intermittent asthma with acute exacerbation    - predniSONE (DELTASONE) 20 MG tablet  Dispense: 10 tablet; Refill: 0  - beclomethasone HFA (QVAR REDIHALER) 80 MCG/ACT inhaler  Dispense: 10.6 g; Refill: 0           Formerly named [facility] Urgent [facility]RG[facility]    Gladys Partida is a 29 year old female who presents to clinic today for the following health issues:  Chief Complaint   Patient presents with    Cough     Lingering for month-better in morning but working exacerbates it, gives back spasms that cause to have to stop walking, if coughing a lot gives headache, usually  dry cough       HPI  Patient is a 29-year-old female with a history of mild persistent/exercise-induced asthma who presents to urgent care with concerns regarding cough x 1 month.  Symptoms started at onset of upper respiratory infection.  The rhinorrhea nasal congestion is improved she denies any presence of any sinus symptoms at this time.  Cough lingering after initial cold.    Coughing fits, dry.  Denies any posttussive emesis.  R back muscle spasm with coughing spasms that are painful.    HA from cough.   Qvar has increased to 2 puffs once daily.    Albuterol: not using/has not tried.     No fevers.    No nausea/vomiting    Rhinorrha ,congestion;   Worse when working, romy and dry working at [employer].    Temp changes increases sx as well.         Review of Systems  Constitutional, HEENT, cardiovascular, pulmonary, gi and gu systems are negative, except as otherwise noted.      Objective    /72 (BP Location: Right arm, Patient Position: Sitting, Cuff Size: Adult Regular)   Pulse 69   Temp 98  F (36.7  C) (Tympanic)   Resp 20   Wt 67.2 kg (148 lb 3.2 oz)   SpO2 98%   BMI 24.29 kg/m    Physical Exam   Pt is in no acute distress and appears well  Ears patent B:  TM s intact, non-injected. All land marks easily visibile    Nasal mucosa is non-edematous, no discharge.    Pharynx: non erythematous, tonsils non hypertrophied, No exudate   Neck supple: no adenopathy  Lungs: CTA  Heart: RRR, no murmur, no thrills or heaves   Ext: no edema  Skin: no rashes    Results for orders placed or performed in visit on 01/26/25   XR Chest 2 Views     Status: None    Narrative    EXAM: XR CHEST 2 VIEWS  LOCATION: [facility]  DATE: 1/26/2025    INDICATION: cough  COMPARISON: None.      Impression    IMPRESSION: Negative chest.

## 2025-02-26 ASSESSMENT — ASTHMA QUESTIONNAIRES
QUESTION_1 LAST FOUR WEEKS HOW MUCH OF THE TIME DID YOUR ASTHMA KEEP YOU FROM GETTING AS MUCH DONE AT WORK, SCHOOL OR AT HOME: NONE OF THE TIME
QUESTION_4 LAST FOUR WEEKS HOW OFTEN HAVE YOU USED YOUR RESCUE INHALER OR NEBULIZER MEDICATION (SUCH AS ALBUTEROL): ONE OR TWO TIMES PER DAY
QUESTION_5 LAST FOUR WEEKS HOW WOULD YOU RATE YOUR ASTHMA CONTROL: WELL CONTROLLED
QUESTION_3 LAST FOUR WEEKS HOW OFTEN DID YOUR ASTHMA SYMPTOMS (WHEEZING, COUGHING, SHORTNESS OF BREATH, CHEST TIGHTNESS OR PAIN) WAKE YOU UP AT NIGHT OR EARLIER THAN USUAL IN THE MORNING: NOT AT ALL
QUESTION_2 LAST FOUR WEEKS HOW OFTEN HAVE YOU HAD SHORTNESS OF BREATH: ONCE OR TWICE A WEEK
ACT_TOTALSCORE: 20

## 2025-02-27 ENCOUNTER — OFFICE VISIT (OUTPATIENT)
Dept: FAMILY MEDICINE | Facility: CLINIC | Age: 30
End: 2025-02-27
Payer: COMMERCIAL

## 2025-02-27 VITALS
SYSTOLIC BLOOD PRESSURE: 104 MMHG | OXYGEN SATURATION: 99 % | BODY MASS INDEX: 24.49 KG/M2 | HEIGHT: 65 IN | RESPIRATION RATE: 16 BRPM | TEMPERATURE: 98.6 F | WEIGHT: 147 LBS | DIASTOLIC BLOOD PRESSURE: 60 MMHG | HEART RATE: 92 BPM

## 2025-02-27 DIAGNOSIS — J45.30 MILD PERSISTENT ASTHMA WITHOUT COMPLICATION: ICD-10-CM

## 2025-02-27 DIAGNOSIS — J45.990 EXERCISE-INDUCED ASTHMA: ICD-10-CM

## 2025-02-27 DIAGNOSIS — Z11.59 ENCOUNTER FOR HCV SCREENING TEST FOR LOW RISK PATIENT: ICD-10-CM

## 2025-02-27 DIAGNOSIS — M62.81 PROXIMAL MUSCLE WEAKNESS: ICD-10-CM

## 2025-02-27 DIAGNOSIS — Z11.4 SCREENING FOR HIV (HUMAN IMMUNODEFICIENCY VIRUS): ICD-10-CM

## 2025-02-27 DIAGNOSIS — Z71.89 ACP (ADVANCE CARE PLANNING): ICD-10-CM

## 2025-02-27 DIAGNOSIS — Z30.9 ENCOUNTER FOR CONTRACEPTIVE MANAGEMENT, UNSPECIFIED TYPE: ICD-10-CM

## 2025-02-27 DIAGNOSIS — Z13.220 LIPID SCREENING: ICD-10-CM

## 2025-02-27 DIAGNOSIS — Z00.00 ROUTINE GENERAL MEDICAL EXAMINATION AT A HEALTH CARE FACILITY: Primary | ICD-10-CM

## 2025-02-27 PROBLEM — E66.9 OBESITY: Status: RESOLVED | Noted: 2025-01-26 | Resolved: 2025-02-27

## 2025-02-27 LAB
ALBUMIN SERPL BCG-MCNC: 4.2 G/DL (ref 3.5–5.2)
ALP SERPL-CCNC: 77 U/L (ref 40–150)
ALT SERPL W P-5'-P-CCNC: 24 U/L (ref 0–50)
ANION GAP SERPL CALCULATED.3IONS-SCNC: 9 MMOL/L (ref 7–15)
AST SERPL W P-5'-P-CCNC: 32 U/L (ref 0–45)
BASOPHILS # BLD AUTO: 0 10E3/UL (ref 0–0.2)
BASOPHILS NFR BLD AUTO: 0 %
BILIRUB SERPL-MCNC: 0.9 MG/DL
BUN SERPL-MCNC: 14 MG/DL (ref 6–20)
CALCIUM SERPL-MCNC: 9.8 MG/DL (ref 8.8–10.4)
CHLORIDE SERPL-SCNC: 101 MMOL/L (ref 98–107)
CHOLEST SERPL-MCNC: 202 MG/DL
CK SERPL-CCNC: 186 U/L (ref 26–192)
CREAT SERPL-MCNC: 0.91 MG/DL (ref 0.51–0.95)
CRP SERPL-MCNC: <3 MG/L
EGFRCR SERPLBLD CKD-EPI 2021: 87 ML/MIN/1.73M2
EOSINOPHIL # BLD AUTO: 0 10E3/UL (ref 0–0.7)
EOSINOPHIL NFR BLD AUTO: 0 %
ERYTHROCYTE [DISTWIDTH] IN BLOOD BY AUTOMATED COUNT: 13.4 % (ref 10–15)
ERYTHROCYTE [SEDIMENTATION RATE] IN BLOOD BY WESTERGREN METHOD: 6 MM/HR (ref 0–20)
FASTING STATUS PATIENT QL REPORTED: NO
FASTING STATUS PATIENT QL REPORTED: NO
GLUCOSE SERPL-MCNC: 99 MG/DL (ref 70–99)
HCO3 SERPL-SCNC: 26 MMOL/L (ref 22–29)
HCT VFR BLD AUTO: 42 % (ref 35–47)
HCV AB SERPL QL IA: NONREACTIVE
HDLC SERPL-MCNC: 96 MG/DL
HGB BLD-MCNC: 13.6 G/DL (ref 11.7–15.7)
HIV 1+2 AB+HIV1 P24 AG SERPL QL IA: NONREACTIVE
IMM GRANULOCYTES # BLD: 0 10E3/UL
IMM GRANULOCYTES NFR BLD: 0 %
LDH SERPL L TO P-CCNC: 190 U/L (ref 0–250)
LDLC SERPL CALC-MCNC: 94 MG/DL
LYMPHOCYTES # BLD AUTO: 1.5 10E3/UL (ref 0.8–5.3)
LYMPHOCYTES NFR BLD AUTO: 21 %
MCH RBC QN AUTO: 27.8 PG (ref 26.5–33)
MCHC RBC AUTO-ENTMCNC: 32.4 G/DL (ref 31.5–36.5)
MCV RBC AUTO: 86 FL (ref 78–100)
MONOCYTES # BLD AUTO: 0.4 10E3/UL (ref 0–1.3)
MONOCYTES NFR BLD AUTO: 5 %
NEUTROPHILS # BLD AUTO: 5.3 10E3/UL (ref 1.6–8.3)
NEUTROPHILS NFR BLD AUTO: 73 %
NONHDLC SERPL-MCNC: 106 MG/DL
PLATELET # BLD AUTO: 293 10E3/UL (ref 150–450)
POTASSIUM SERPL-SCNC: 4.5 MMOL/L (ref 3.4–5.3)
PROT SERPL-MCNC: 7.8 G/DL (ref 6.4–8.3)
RBC # BLD AUTO: 4.9 10E6/UL (ref 3.8–5.2)
SODIUM SERPL-SCNC: 136 MMOL/L (ref 135–145)
TRIGL SERPL-MCNC: 62 MG/DL
WBC # BLD AUTO: 7.3 10E3/UL (ref 4–11)

## 2025-02-27 RX ORDER — ALBUTEROL SULFATE 90 UG/1
2 INHALANT RESPIRATORY (INHALATION) EVERY 4 HOURS PRN
Qty: 54 G | Refills: 3 | Status: SHIPPED | OUTPATIENT
Start: 2025-02-27

## 2025-02-27 RX ORDER — FLUTICASONE PROPIONATE 220 UG/1
1 AEROSOL, METERED RESPIRATORY (INHALATION) 2 TIMES DAILY
Qty: 36 G | Refills: 3 | Status: SHIPPED | OUTPATIENT
Start: 2025-02-27

## 2025-02-27 RX ORDER — DROSPIRENONE AND ETHINYL ESTRADIOL 0.03MG-3MG
1 KIT ORAL DAILY
Qty: 84 TABLET | Refills: 3 | Status: SHIPPED | OUTPATIENT
Start: 2025-02-27

## 2025-02-27 SDOH — HEALTH STABILITY: PHYSICAL HEALTH: ON AVERAGE, HOW MANY MINUTES DO YOU ENGAGE IN EXERCISE AT THIS LEVEL?: 60 MIN

## 2025-02-27 SDOH — HEALTH STABILITY: PHYSICAL HEALTH: ON AVERAGE, HOW MANY DAYS PER WEEK DO YOU ENGAGE IN MODERATE TO STRENUOUS EXERCISE (LIKE A BRISK WALK)?: 6 DAYS

## 2025-02-27 ASSESSMENT — SOCIAL DETERMINANTS OF HEALTH (SDOH): HOW OFTEN DO YOU GET TOGETHER WITH FRIENDS OR RELATIVES?: ONCE A WEEK

## 2025-02-27 ASSESSMENT — ASTHMA QUESTIONNAIRES: ACT_TOTALSCORE: 20

## 2025-02-27 NOTE — PATIENT INSTRUCTIONS
Patient Education   Preventive Care Advice   This is general advice given by our system to help you stay healthy. However, your care team may have specific advice just for you. Please talk to your care team about your preventive care needs.  Nutrition  Eat 5 or more servings of fruits and vegetables each day.  Try wheat bread, brown rice and whole grain pasta (instead of white bread, rice, and pasta).  Get enough calcium and vitamin D. Check the label on foods and aim for 100% of the RDA (recommended daily allowance).  Lifestyle  Exercise at least 150 minutes each week  (30 minutes a day, 5 days a week).  Do muscle strengthening activities 2 days a week. These help control your weight and prevent disease.  No smoking.  Wear sunscreen to prevent skin cancer.  Have a dental exam and cleaning every 6 months.  Yearly exams  See your health care team every year to talk about:  Any changes in your health.  Any medicines your care team has prescribed.  Preventive care, family planning, and ways to prevent chronic diseases.  Shots (vaccines)   HPV shots (up to age 26), if you've never had them before.  Hepatitis B shots (up to age 59), if you've never had them before.  COVID-19 shot: Get this shot when it's due.  Flu shot: Get a flu shot every year.  Tetanus shot: Get a tetanus shot every 10 years.  Pneumococcal, hepatitis A, and RSV shots: Ask your care team if you need these based on your risk.  Shingles shot (for age 50 and up)  General health tests  Diabetes screening:  Starting at age 35, Get screened for diabetes at least every 3 years.  If you are younger than age 35, ask your care team if you should be screened for diabetes.  Cholesterol test: At age 39, start having a cholesterol test every 5 years, or more often if advised.  Bone density scan (DEXA): At age 50, ask your care team if you should have this scan for osteoporosis (brittle bones).  Hepatitis C: Get tested at least once in your life.  STIs (sexually  transmitted infections)  Before age 24: Ask your care team if you should be screened for STIs.  After age 24: Get screened for STIs if you're at risk. You are at risk for STIs (including HIV) if:  You are sexually active with more than one person.  You don't use condoms every time.  You or a partner was diagnosed with a sexually transmitted infection.  If you are at risk for HIV, ask about PrEP medicine to prevent HIV.  Get tested for HIV at least once in your life, whether you are at risk for HIV or not.  Cancer screening tests  Cervical cancer screening: If you have a cervix, begin getting regular cervical cancer screening tests starting at age 21.  Breast cancer scan (mammogram): If you've ever had breasts, begin having regular mammograms starting at age 40. This is a scan to check for breast cancer.  Colon cancer screening: It is important to start screening for colon cancer at age 45.  Have a colonoscopy test every 10 years (or more often if you're at risk) Or, ask your provider about stool tests like a FIT test every year or Cologuard test every 3 years.  To learn more about your testing options, visit:   .  For help making a decision, visit:   https://bit.ly/gw49822.  Prostate cancer screening test: If you have a prostate, ask your care team if a prostate cancer screening test (PSA) at age 55 is right for you.  Lung cancer screening: If you are a current or former smoker ages 50 to 80, ask your care team if ongoing lung cancer screenings are right for you.  For informational purposes only. Not to replace the advice of your health care provider. Copyright   2023 [employer]. All rights reserved. Clinically reviewed by the [facility]itions Program. [employer] 814553 - REV 01/24.

## 2025-02-27 NOTE — PROGRESS NOTES
Preventive Care Visit  [facility]  Dariela Jackman MD, Family Medicine  Feb 27, 2025      Assessment & Plan   Problem List Items Addressed This Visit       Exercise-induced asthma    Relevant Medications    albuterol (PROAIR HFA/PROVENTIL HFA/VENTOLIN HFA) 108 (90 Base) MCG/ACT inhaler    fluticasone (FLOVENT HFA) 220 MCG/ACT inhaler     Other Visit Diagnoses       Routine general medical examination at a health care facility    -  Primary    Relevant Orders    Primary Care - Care Coordination Referral    Proximal muscle weakness        Relevant Orders    CRP, inflammation (Completed)    ESR: Erythrocyte sedimentation rate (Completed)    CK total (Completed)    Lactate Dehydrogenase (Completed)    Comprehensive metabolic panel (BMP + Alb, Alk Phos, ALT, AST, Total. Bili, TP) (Completed)    SSA Ro BRIEN Antibody IgG    SSB La BRIEN Antibody IgG    Anti Nuclear María Elena IgG by IFA with Reflex    CBC with Platelets & Differential (Completed)    Adult Neurology  Referral    Mild persistent asthma without complication        Relevant Medications    albuterol (PROAIR HFA/PROVENTIL HFA/VENTOLIN HFA) 108 (90 Base) MCG/ACT inhaler    fluticasone (FLOVENT HFA) 220 MCG/ACT inhaler    Spacer/Aero-Holding Chambers (SPACER/AERO-HOLD CHAMBER MASK) ELI    Encounter for contraceptive management, unspecified type        Relevant Medications    drospirenone-ethinyl estradiol (EVELYN) 3-0.03 MG tablet    Lipid screening        Relevant Orders    Lipid panel reflex to direct LDL Non-fasting (Completed)    Encounter for HCV screening test for low risk patient        Relevant Orders    Hepatitis C Screen Reflex to HCV RNA Quant and Genotype (Completed)    Screening for HIV (human immunodeficiency virus)        Relevant Orders    HIV Antigen Antibody Combo (Completed)    ACP (advance care planning)             Nonfasting labs    Patient is a professional .  For the past 3 to 4 weeks she has  noted that she has developed proximal bilateral lower extremity weakness.  By the end of her routine when she has to go down onto her knees she has a very hard time standing back up again.  Symptoms usually with the end of a short time after completing her routine.  She does not have new skates she has not changed the routine.  She had an upper respiratory infection recently that did cause an asthma exacerbation however she is done with her steroids and her asthma now feels well-controlled.  No paresthesias or other focal neurological deficits.  She denies neck flexion or extension weakness.  Arms do not seem to get the same fatigue.  She has discussed that with her  will also finds that this is quite unusual.    Mild persistent asthma currently without complication.  She recently got her Qvar renewed but tells me that she does feel like fluticasone works better for her.  Fluticasone is not a preferred drug.  We have documented that Qvar is as effective for her and will place order for Flovent.  Chart review shows that she was on the 220 mg dose.  Also renewed her albuterol if she needs it both as a rescue inhaler and as a preexercise inhaler.  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    She feels that her current birth control pill is working well for her for contraception.  Will get this renewed.  She does think that the generic Julianna works better than the brand name.    Using joint medical decision making we will get her lipid screening HCV screening and HIV screening done.    Proximal muscle weakness will place labs as noted above, concern for polymyositis.  Will also place referral to neurology.  If symptoms resolved before her appointment with neurology then she can cancel the appointment.               Counseling  Appropriate preventive services were addressed with this patient via screening, questionnaire, or discussion as appropriate for fall  prevention, nutrition, physical activity, Tobacco-use cessation, social engagement, weight loss and cognition.  Checklist reviewing preventive services available has been given to the patient.  Reviewed patient's diet, addressing concerns and/or questions.           Gladys Partida is a 29 year old, presenting for the following:  No chief complaint on file.        2/27/2025    11:37 AM   Additional Questions   Roomed by Gricel          History of Present Illness       Reason for visit:  Yearly wellness check   She is taking medications regularly.    Well adult and proximal lower extremity weakness x past few weeks        Advance Care Planning  Patient does not have a Health Care Directive: Discussed advance care planning with patient; information given to patient to review.      2/27/2025   General Health   How would you rate your overall physical health? Excellent   Feel stress (tense, anxious, or unable to sleep) Only a little   (!) STRESS CONCERN      2/27/2025   Nutrition   Three or more servings of calcium each day? Yes   Diet: Regular (no restrictions)   How many servings of fruit and vegetables per day? (!) 0-1   How many sweetened beverages each day? 0-1         2/27/2025   Exercise   Days per week of moderate/strenous exercise 6 days   Average minutes spent exercising at this level 60 min         2/27/2025   Social Factors   Frequency of gathering with friends or relatives Once a week   Worry food won't last until get money to buy more No   Food not last or not have enough money for food? No   Do you have housing? (Housing is defined as stable permanent housing and does not include staying ouside in a car, in a tent, in an abandoned building, in an overnight shelter, or couch-surfing.) No   Are you worried about losing your housing? No   Lack of transportation? No   Unable to get utilities (heat,electricity)? No   Want help with housing or utility concern? No   (!) HOUSING CONCERN PRESENT      2/27/2025  "  Dental   Dentist two times every year? Yes         2/21/2024   TB Screening   Were you born outside of the [place]? No         Today's PHQ-2 Score:       2/26/2025     1:36 PM   PHQ-2 ( 1999 [employer])   Q1: Little interest or pleasure in doing things 0   Q2: Feeling down, depressed or hopeless 0   PHQ-2 Score 0    Q1: Little interest or pleasure in doing things Not at all   Q2: Feeling down, depressed or hopeless Not at all   PHQ-2 Score 0       Patient-reported           2/27/2025   Substance Use   Alcohol more than 3/day or more than 7/wk No   Do you use any other substances recreationally? No     Social History     Tobacco Use    Smoking status: Never     Passive exposure: Never    Smokeless tobacco: Never   Vaping Use    Vaping status: Never Used   Substance Use Topics    Alcohol use: Yes     Comment: on occasion    Drug use: Never                    2/27/2025   One time HIV Screening   Previous HIV test? I don't know         2/27/2025   STI Screening   New sexual partner(s) since last STI/HIV test? No     History of abnormal Pap smear: No - age 30- 64 PAP with HPV every 5 years recommended        3/2/2023     7:17 AM   PAP / HPV   PAP Negative for Intraepithelial Lesion or Malignancy (NILM)            2/27/2025   Contraception/Family Planning   Questions about contraception or family planning No        Reviewed and updated as needed this visit by Provider   Tobacco  Allergies  Meds  Problems  Med Hx  Surg Hx  Fam Hx                     Objective    Exam  /60 (BP Location: Right arm, Patient Position: Sitting)   Pulse 92   Temp 98.6  F (37  C) (Tympanic)   Resp 16   Ht 1.651 m (5' 5\")   Wt 66.7 kg (147 lb)   LMP  (LMP Unknown)   SpO2 99%   BMI 24.46 kg/m     Estimated body mass index is 24.46 kg/m  as calculated from the following:    Height as of this encounter: 1.651 m (5' 5\").    Weight as of this encounter: 66.7 kg (147 lb).    Physical Exam        General: alert and oriented ×3 no acute " distress.    HEENT: Normocephalic and atraumatic.   Eyes pupils are equal round and reactive to light     Hearing is grossly normal     Nares are patent there is no rhinorrhea.     Mucous membranes are moist and pink.    Chest: has bilateral rise with no increased work of breathing. clear to auscultation without wheezes, rhonchi, or rales.    Cardiovascular: normal perfusion and brisk capillary refill. S1S2 with regular rate and rhythm and no murmurs, gallops or rubs.    Musculoskeletal: no gross focal abnormalities and normal gait.    Neuro: no gross focal abnormalities and memory seems intact. CN 2-12 are grossly intact.    Psychiatric: speech is clear and coherent and fluent. Patient dressed appropriately for the weather. Mood is appropriate and affect is full.          Signed Electronically by: Dariela Jackman MD

## 2025-03-03 ENCOUNTER — PATIENT OUTREACH (OUTPATIENT)
Dept: CARE COORDINATION | Facility: CLINIC | Age: 30
End: 2025-03-03
Payer: COMMERCIAL

## 2025-03-03 LAB
ENA SS-A AB SER IA-ACNC: 0.5 U/ML
ENA SS-A AB SER IA-ACNC: NEGATIVE
ENA SS-B IGG SER IA-ACNC: <0.6 U/ML
ENA SS-B IGG SER IA-ACNC: NEGATIVE

## 2025-03-03 NOTE — PROGRESS NOTES
Clinic Care Coordination Contact  Community Health Worker Initial Outreach    CHW Initial Information Gathering:  Referral Source: PCP  Current living arrangement:: Not Assessed  No PCP office visit in Past Year: No  CHW Additional Questions  If ED/Hospital discharge, follow-up appointment scheduled as recommended?: N/A  Medication changes made following ED/Hospital discharge?: N/A  MyChart active?: Yes  Patient sent Social Drivers of Health questionnaire?: No    Patient accepts CC: No, Patient expressed no additional support is needed at this time. Patient will be sent Care Coordination introduction letter for future reference.     Gabriela Blake  Community Health Worker    Connected Care Resources   [facility]     *Connected Care Resources Team does NOT   follow patient ongoing. Referrals are identified   based on internal discharge report and the outreach is to ensure   Patient has understanding of their discharge instructions.

## 2025-03-03 NOTE — LETTER
[facility] CARE COORDINATION  [address] [facility] [address]    March 3, 2025    Chasity Mendoza  [address] [facility] [address]      Dear Helga,    I am a clinic community health worker who works with Dariela Jackman MD with the [facility]. I wanted to thank you for spending the time to talk with me.  Below is a description of clinic care coordination and how I can further assist you.       The clinic care coordination team is made up of a registered nurse, , financial resource worker and community health worker who understand the health care system. The goal of clinic care coordination is to help you manage your health and improve access to the health care system. Our team works alongside your provider to assist you in determining your health and social needs. We can help you obtain health care and community resources, providing you with necessary information and education. We can work with you through any barriers and develop a care plan that helps coordinate and strengthen the communication between you and your care team.  Our services are voluntary and are offered without charge to you personally.    Please feel free to contact Community Health WorkerJodie at [phone] with any questions or concerns regarding care coordination and what we can offer.      We are focused on providing you with the highest-quality healthcare experience possible.    Sincerely,     Gabriela BLANCO  Community Health Worker    Connected Care Resources   [facility]

## 2025-04-08 DIAGNOSIS — J45.21 MILD INTERMITTENT ASTHMA WITH ACUTE EXACERBATION: ICD-10-CM

## 2025-04-08 RX ORDER — BECLOMETHASONE DIPROPIONATE HFA 80 UG/1
1 AEROSOL, METERED RESPIRATORY (INHALATION) 2 TIMES DAILY
Qty: 10.6 G | Refills: 2 | Status: SHIPPED | OUTPATIENT
Start: 2025-04-08

## 2025-07-12 ENCOUNTER — MYC MEDICAL ADVICE (OUTPATIENT)
Dept: FAMILY MEDICINE | Facility: CLINIC | Age: 30
End: 2025-07-12
Payer: COMMERCIAL

## 2025-07-14 ENCOUNTER — OFFICE VISIT (OUTPATIENT)
Dept: FAMILY MEDICINE | Facility: CLINIC | Age: 30
End: 2025-07-14
Payer: COMMERCIAL

## 2025-07-14 VITALS
HEIGHT: 65 IN | SYSTOLIC BLOOD PRESSURE: 110 MMHG | DIASTOLIC BLOOD PRESSURE: 64 MMHG | BODY MASS INDEX: 24.16 KG/M2 | WEIGHT: 145 LBS | HEART RATE: 70 BPM | RESPIRATION RATE: 20 BRPM | TEMPERATURE: 97.6 F | OXYGEN SATURATION: 98 %

## 2025-07-14 DIAGNOSIS — J45.30 MILD PERSISTENT ASTHMA WITHOUT COMPLICATION: ICD-10-CM

## 2025-07-14 DIAGNOSIS — R13.19 ESOPHAGEAL DYSPHAGIA: Primary | ICD-10-CM

## 2025-07-14 PROCEDURE — 99214 OFFICE O/P EST MOD 30 MIN: CPT | Performed by: FAMILY MEDICINE

## 2025-07-14 PROCEDURE — G2211 COMPLEX E/M VISIT ADD ON: HCPCS | Performed by: FAMILY MEDICINE

## 2025-07-14 NOTE — TELEPHONE ENCOUNTER
Call to patient, No symptoms at time of call. Only happens when she eats. Patient agreeable to scheduling appointment today, 7/14  with Dr. Jackman.

## 2025-07-14 NOTE — PROGRESS NOTES
Assessment & Plan   Problem List Items Addressed This Visit       Mild persistent asthma without complication     Other Visit Diagnoses         Esophageal dysphagia    -  Primary    Relevant Orders    Speech Therapy  Referral    XR Video Swallow with SLP or OT - Order with Speech Therapy Referral    Adult Gen Surg  Referral    Adult GI  Referral - Procedure Only             Assessment & Plan  Esophageal dysphagia:  - Swallowing issue possibly related to acid reflux. Family history of similar issues. Previous treatment with Protonix provided partial relief. Further investigation needed to determine if surgical treatment is necessary.  - Order a swallow study to assess swallowing mechanics. Referral to general surgery and gastroenterology for further evaluation. Schedule an EGD at [facility], preferably before seeing the general surgeon. If the swallow study is done locally, follow up with results. If done at an external facility, ensure a copy of the imaging is available for [facility]. If no contact is made within two days, patient to call the provided number to schedule appointments.    Mild persistent asthma without complication:  - Asthma appears to be well controlled with current medication.  - Ensure refills for Flovent are up-to-date.     Consent was obtained from the patient to use an AI documentation tool in the creation of  this note.  Voice recognition software was also used.  There may be associated transcribing errors.     The longitudinal plan of care for the diagnosis(es)/condition(s) as documented were addressed during this visit. Due to the added complexity in care, I will continue to support Helga in the subsequent management and with ongoing continuity of care.         Gladys Partida is a 30 year old, presenting for the following health issues:  Throat Problem (Food getting stuck in throat.  Has been an issue for a while but seems to be getting worse)    History  of Present Illness       Reason for visit:  Acid reflux and swallowing trouble    She eats 0-1 servings of fruits and vegetables daily.She consumes 0 sweetened beverage(s) daily.She exercises with enough effort to increase her heart rate 60 or more minutes per day.  She exercises with enough effort to increase her heart rate 5 days per week.   She is taking medications regularly.   Helga Mendoza, 30-year-old female  - Swallowing issue ongoing since approximately August 2024, previously evaluated by Dr. Borrero  - Symptom described as food getting stuck in throat for over 5 seconds, sometimes lasting 1-2 minutes, relieved at times by drinking water  - Occasional associated lightheadedness  - Onset of acid reflux symptoms, including heartburn, since March 2024  - No prior history of acid reflux before March 2024  - Family history of similar swallowing issues in father and paternal grandfather  - Previously treated with Protonix for one month with partial improvement, symptoms persisted  - Currently taking famotidine for several weeks, unclear benefit  - No prior swallow study, EGD, or abdominal CT scan performed  - History of asthma, previously on beclomethasone inhalers, currently using formoterol  - Reports mole being monitored for changes      GERD/Heartburn  Onset/Duration: over a year  Description: food getting stuck in throat  Intensity: moderate  Progression of Symptoms: worsening  Accompanying Signs & Symptoms:  Does it feel like food gets stuck or trouble swallowing: YES  Nausea: No  Vomiting (bloody?): No  Abdominal Pain: No  Black-Tarry stools: No  Bloody stools: No  History:  Previous similar episodes: No  Previous ulcers: No  Precipitating factors:   Caffeine use: N/A  Alcohol use: N/A  NSAID/Aspirin use: N/A  Tobacco use: No  Worse with bread, pastas, larger pieces of food.  Alleviating factors: None  Therapies tried and outcome:             Lifestyle changes: monitoring diet            Medications:  "Pepcid (famotidine)            Objective    /64   Pulse 70   Temp 97.6  F (36.4  C)   Resp 20   Ht 1.651 m (5' 5\")   Wt 65.8 kg (145 lb)   SpO2 98%   BMI 24.13 kg/m    Body mass index is 24.13 kg/m .  Physical Exam               Signed Electronically by: Dariela Jackman MD    "

## 2025-08-04 ENCOUNTER — HOSPITAL ENCOUNTER (OUTPATIENT)
Facility: AMBULATORY SURGERY CENTER | Age: 30
End: 2025-08-04
Attending: SURGERY

## 2025-08-04 ENCOUNTER — OFFICE VISIT (OUTPATIENT)
Dept: SURGERY | Facility: CLINIC | Age: 30
End: 2025-08-04
Attending: FAMILY MEDICINE

## 2025-08-04 DIAGNOSIS — R13.19 ESOPHAGEAL DYSPHAGIA: Primary | ICD-10-CM

## 2025-08-04 PROCEDURE — 99204 OFFICE O/P NEW MOD 45 MIN: CPT | Performed by: SURGERY

## 2025-08-15 ENCOUNTER — HOSPITAL ENCOUNTER (OUTPATIENT)
Dept: RADIOLOGY | Facility: CLINIC | Age: 30
Discharge: HOME | End: 2025-08-15
Attending: SURGERY | Admitting: SURGERY

## 2025-08-15 DIAGNOSIS — R13.19 ESOPHAGEAL DYSPHAGIA: ICD-10-CM

## 2025-08-15 PROCEDURE — 74220 X-RAY XM ESOPHAGUS 1CNTRST: CPT
